# Patient Record
Sex: FEMALE | Race: WHITE | NOT HISPANIC OR LATINO
[De-identification: names, ages, dates, MRNs, and addresses within clinical notes are randomized per-mention and may not be internally consistent; named-entity substitution may affect disease eponyms.]

---

## 2017-02-08 PROBLEM — Z00.00 ENCOUNTER FOR PREVENTIVE HEALTH EXAMINATION: Status: ACTIVE | Noted: 2017-02-08

## 2017-02-09 ENCOUNTER — APPOINTMENT (OUTPATIENT)
Dept: DERMATOLOGY | Facility: CLINIC | Age: 67
End: 2017-02-09

## 2017-03-23 ENCOUNTER — APPOINTMENT (OUTPATIENT)
Dept: DERMATOLOGY | Facility: CLINIC | Age: 67
End: 2017-03-23

## 2019-04-30 ENCOUNTER — APPOINTMENT (OUTPATIENT)
Dept: DERMATOLOGY | Facility: CLINIC | Age: 69
End: 2019-04-30
Payer: MEDICARE

## 2019-04-30 PROCEDURE — 99214 OFFICE O/P EST MOD 30 MIN: CPT

## 2020-01-09 ENCOUNTER — APPOINTMENT (OUTPATIENT)
Dept: OBGYN | Facility: CLINIC | Age: 70
End: 2020-01-09
Payer: MEDICARE

## 2020-01-09 VITALS
DIASTOLIC BLOOD PRESSURE: 77 MMHG | HEART RATE: 78 BPM | WEIGHT: 130 LBS | SYSTOLIC BLOOD PRESSURE: 136 MMHG | BODY MASS INDEX: 24.55 KG/M2 | HEIGHT: 61 IN

## 2020-01-09 DIAGNOSIS — Z78.9 OTHER SPECIFIED HEALTH STATUS: ICD-10-CM

## 2020-01-09 DIAGNOSIS — Z91.81 HISTORY OF FALLING: ICD-10-CM

## 2020-01-09 DIAGNOSIS — H91.93 UNSPECIFIED HEARING LOSS, BILATERAL: ICD-10-CM

## 2020-01-09 DIAGNOSIS — N95.2 POSTMENOPAUSAL ATROPHIC VAGINITIS: ICD-10-CM

## 2020-01-09 DIAGNOSIS — Z86.39 PERSONAL HISTORY OF OTHER ENDOCRINE, NUTRITIONAL AND METABOLIC DISEASE: ICD-10-CM

## 2020-01-09 DIAGNOSIS — K21.9 GASTRO-ESOPHAGEAL REFLUX DISEASE W/OUT ESOPHAGITIS: ICD-10-CM

## 2020-01-09 DIAGNOSIS — Z87.39 PERSONAL HISTORY OF OTHER DISEASES OF THE MUSCULOSKELETAL SYSTEM AND CONNECTIVE TISSUE: ICD-10-CM

## 2020-01-09 LAB
BILIRUB UR QL STRIP: NORMAL
CLARITY UR: CLEAR
COLLECTION METHOD: NORMAL
GLUCOSE UR-MCNC: NORMAL
HCG UR QL: 0.2 EU/DL
HGB UR QL STRIP.AUTO: NORMAL
KETONES UR-MCNC: NORMAL
LEUKOCYTE ESTERASE UR QL STRIP: NORMAL
NITRITE UR QL STRIP: NORMAL
PH UR STRIP: 6.5
PROT UR STRIP-MCNC: NORMAL
SP GR UR STRIP: 1.02

## 2020-01-09 PROCEDURE — 81002 URINALYSIS NONAUTO W/O SCOPE: CPT

## 2020-01-09 PROCEDURE — 99397 PER PM REEVAL EST PAT 65+ YR: CPT

## 2020-01-09 RX ORDER — OMEPRAZOLE 20 MG/1
TABLET, ORALLY DISINTEGRATING, DELAYED RELEASE ORAL
Refills: 0 | Status: ACTIVE | COMMUNITY

## 2020-01-09 RX ORDER — SIMVASTATIN 10 MG/1
10 TABLET, FILM COATED ORAL
Refills: 0 | Status: ACTIVE | COMMUNITY

## 2020-01-09 NOTE — CHIEF COMPLAINT
[FreeTextEntry1] : 70 yo  P4 LMP at 52 yo presents for annual Gyn exam. She reports vaginal dryness, painful intercourse. Patient reports years "ago was prescribe something, but never used it"\par Patient reports loss twin sister one year ago from esophageal cancer. Her niece had a baby this past summer.

## 2020-01-09 NOTE — HISTORY OF PRESENT ILLNESS
[___ Month(s) Ago] : [unfilled] month(s) ago [Good] : being in good health [Last Mammogram ___] : Last Mammogram was [unfilled] [Last Bone Density ___] : Last bone density studies [unfilled] [Last Colonoscopy ___] : Last colonoscopy [unfilled] [Last Pap ___] : Last cervical pap smear was [unfilled] [Postmenopausal] : is postmenopausal [Definite:  ___ (Date)] : the last menstrual period was [unfilled] [Sexually Active] : is sexually active [Monogamous] : is monogamous [Male ___] : [unfilled] male [HSV] : HSV - [HIV] : HIV - [RPR] : RPR - [Hepatitis] : Hepatitis - [Chlamydia] : Chlamydia - [Gonorrhea] : Gonorrhea - [HPV] : HPV - [de-identified] : no hx of abnormal pap [de-identified] :  x 16 years ago

## 2020-01-09 NOTE — PHYSICAL EXAM
[Normal] : cervix [No Bleeding] : there was no active vaginal bleeding [Discharge] : a  ~M vaginal discharge was present [Erythema] : erythema [Uterine Adnexae] : were not tender and not enlarged [Vulvitis] : vulvitis [Scant] : scant [White] : white [FreeTextEntry7] : suboptimal pelvic exam secondary to body habitus

## 2020-01-10 LAB
CANDIDA VAG CYTO: NOT DETECTED
G VAGINALIS+PREV SP MTYP VAG QL MICRO: NOT DETECTED
HPV HIGH+LOW RISK DNA PNL CVX: DETECTED
T VAGINALIS VAG QL WET PREP: NOT DETECTED

## 2020-01-21 LAB — CYTOLOGY CVX/VAG DOC THIN PREP: ABNORMAL

## 2020-01-22 ENCOUNTER — RESULT REVIEW (OUTPATIENT)
Age: 70
End: 2020-01-22

## 2020-02-10 ENCOUNTER — APPOINTMENT (OUTPATIENT)
Dept: OBGYN | Facility: CLINIC | Age: 70
End: 2020-02-10
Payer: MEDICARE

## 2020-02-10 VITALS
WEIGHT: 128 LBS | SYSTOLIC BLOOD PRESSURE: 124 MMHG | HEIGHT: 61 IN | BODY MASS INDEX: 24.17 KG/M2 | DIASTOLIC BLOOD PRESSURE: 78 MMHG

## 2020-02-10 DIAGNOSIS — B97.7 PAPILLOMAVIRUS AS THE CAUSE OF DISEASES CLASSIFIED ELSEWHERE: ICD-10-CM

## 2020-02-10 PROCEDURE — 99203 OFFICE O/P NEW LOW 30 MIN: CPT

## 2021-02-09 ENCOUNTER — APPOINTMENT (OUTPATIENT)
Dept: DERMATOLOGY | Facility: CLINIC | Age: 71
End: 2021-02-09
Payer: MEDICARE

## 2021-02-09 PROCEDURE — 17110 DESTRUCTION B9 LES UP TO 14: CPT

## 2021-02-09 PROCEDURE — 99214 OFFICE O/P EST MOD 30 MIN: CPT | Mod: 25

## 2021-02-12 ENCOUNTER — APPOINTMENT (OUTPATIENT)
Dept: OBGYN | Facility: CLINIC | Age: 71
End: 2021-02-12
Payer: MEDICARE

## 2021-02-12 VITALS
DIASTOLIC BLOOD PRESSURE: 78 MMHG | BODY MASS INDEX: 25.68 KG/M2 | HEIGHT: 61 IN | SYSTOLIC BLOOD PRESSURE: 148 MMHG | WEIGHT: 136 LBS

## 2021-02-12 DIAGNOSIS — N90.89 OTHER SPECIFIED NONINFLAMMATORY DISORDERS OF VULVA AND PERINEUM: ICD-10-CM

## 2021-02-12 PROCEDURE — G0101: CPT

## 2021-02-12 PROCEDURE — 99214 OFFICE O/P EST MOD 30 MIN: CPT | Mod: 25

## 2021-02-12 NOTE — PHYSICAL EXAM
[Appropriately responsive] : appropriately responsive [Alert] : alert [No Acute Distress] : no acute distress [No Lymphadenopathy] : no lymphadenopathy [Regular Rate Rhythm] : regular rate rhythm [No Murmurs] : no murmurs [Clear to Auscultation B/L] : clear to auscultation bilaterally [Soft] : soft [Non-tender] : non-tender [Non-distended] : non-distended [No HSM] : No HSM [No Lesions] : no lesions [No Mass] : no mass [Oriented x3] : oriented x3 [Examination Of The Breasts] : a normal appearance [No Masses] : no breast masses were palpable [Labia Majora] : normal [Labia Minora] : normal [Normal] : normal [Uterine Adnexae] : normal [FreeTextEntry2] : bilateral minimal hypopigmented skin noted labia minora.

## 2021-02-12 NOTE — DISCUSSION/SUMMARY
[FreeTextEntry1] : Well woman exam\par \par pap done- if + hpv again will  need colposcopy\par mammo ordered\par bone density ordered\par recommended taking vit. D 2000 units daily and through diet obtain 1200 mg of calcium along with 2.5 hours of weight bearing exercise per week\par return in 1 year\par \par vulvar itching, possible LS-Clobetasol cream

## 2021-02-12 NOTE — HISTORY OF PRESENT ILLNESS
[FreeTextEntry1] : 69 yo here for av. She has slight outer vaginal itching on and off. She denies Pmb, no Nathalia.\par Last colonoscopy 2 years ago.\par Has h/o + hpv last year, negative pap.

## 2021-02-15 LAB — HPV HIGH+LOW RISK DNA PNL CVX: NOT DETECTED

## 2021-02-19 LAB — CYTOLOGY CVX/VAG DOC THIN PREP: ABNORMAL

## 2021-04-20 ENCOUNTER — APPOINTMENT (OUTPATIENT)
Dept: SURGERY | Facility: CLINIC | Age: 71
End: 2021-04-20
Payer: MEDICARE

## 2021-04-20 VITALS
SYSTOLIC BLOOD PRESSURE: 195 MMHG | BODY MASS INDEX: 25.49 KG/M2 | WEIGHT: 135 LBS | HEIGHT: 61 IN | OXYGEN SATURATION: 97 % | HEART RATE: 121 BPM | DIASTOLIC BLOOD PRESSURE: 99 MMHG | TEMPERATURE: 98 F

## 2021-04-20 DIAGNOSIS — F41.9 ANXIETY DISORDER, UNSPECIFIED: ICD-10-CM

## 2021-04-20 DIAGNOSIS — G47.9 SLEEP DISORDER, UNSPECIFIED: ICD-10-CM

## 2021-04-20 PROCEDURE — 99204 OFFICE O/P NEW MOD 45 MIN: CPT

## 2021-04-20 NOTE — HISTORY OF PRESENT ILLNESS
[FreeTextEntry1] : I had the pleasure of seeing Kristie Gruber in the office for newly diagnosed left breast cancer. Kristie is a celestine 69 yo postmenopausal female who is accompanied by her friend.\par \par She recently underwent bilateral screening mammogram 3/8/2021 which demonstrated a new mammographic mass at the 9:00 position of the left breast which was biopsied and demonstrated poorly differentiated invasive carcinoma ER- AR- [id number] -\par \par She denies dominant breast mass, skin changes or nipple discharge. \par She has a family history including twin sister dx and  from esophageal carcinoma. \par \par Imaging: \par [facility]\par 3/18/2021\par Bilateral screening mammogram/tomosynthesis BIRADS 0: Needs additional imaging\par Impression: there is a 1cm circumscribed nodule in the left breast medially. \par 2021 Left breast ultrasound: Impression: 4c Left breast mass corresponding to mammographic finding. \par 2021 Left breast post biopsy mammogram\par Impression: Status post left breast ultrasound guided core biopsy. Pathology report in EMR- left breast triple negative grade 3 IDC.\par \par We reviewed the pathophysiology of poorly differentiated invasive ductal carcinoma and the patient understands that it is poorly differentiated triple negative and aggressive.  I have discussed the significance of triple negative breast cancer.  We also discussed the need for staging work up which will include a PET/CT scan and MRI breast to rule out distant metastatic disease if the medical oncologist recommends.\par \par She also understands that genetic testing is recommended secondary to her age.  She was counseled and tested during the visit.  \par \par We discussed treatment options of breast conservation and mastectomy.  She understands that secondary to the evidence of locally advanced disease, systemic treatment is recommended.  Prior to initiation of systemic therapy, staging work up will be performed. We will obtain MRI of the breast\par \par In addition we reviewed risks v benefits of surgical options and treatment modalities including chemotherapy, targeted therapy and radiation therapy.\par \par All questions were answered.\par

## 2021-04-20 NOTE — ASSESSMENT
[FreeTextEntry1] : 69 yo postmenopausal female presents with newly diagnosed triple negative breast cancer of the left breast measuring approximately 1cm We discussed need for treatment including but not limited to chemotherapy, surgery +/- radiation. She  will undergo further work up and consults with return to discuss surgical option.\par 1. MRI of the breast\par 2. Internal review of imaging/pathology\par 3. Radiation Oncology consult\par 4. Plastic Surgery Consult\par 5. Genetic testing

## 2021-04-20 NOTE — PHYSICAL EXAM
[Normocephalic] : normocephalic [Atraumatic] : atraumatic [EOMI] : extra ocular movement intact [PERRL] : pupils equal, round and reactive to light [Sclera nonicteric] : sclera nonicteric [Supple] : supple [No Supraclavicular Adenopathy] : no supraclavicular adenopathy [Examined in the supine and seated position] : examined in the supine and seated position [No dominant masses] : no dominant masses in right breast  [No dominant masses] : no dominant masses left breast [No Nipple Retraction] : no left nipple retraction [No Nipple Discharge] : no left nipple discharge [Breast Nipple Inversion] : nipples not inverted [Breast Nipple Retraction] : nipples not retracted [Breast Nipple Flattening] : nipples not flattened [Breast Nipple Fissures] : nipples not fissured [Breast Abnormal Lactation (Galactorrhea)] : no galactorrhea [Breast Abnormal Secretion Bloody Fluid] : no bloody discharge [Breast Abnormal Secretion Serous Fluid] : no serous discharge [Breast Abnormal Secretion Opalescent Fluid] : no milky discharge [No Axillary Lymphadenopathy] : no left axillary lymphadenopathy [No Edema] : no edema [No Rashes] : no rashes [No Ulceration] : no ulceration [de-identified] : No dominant breast mass, everted nipple without discharge.  [de-identified] : No dominant breast mass, everted nipple without discharge. Bruising at biopsy site.

## 2021-04-27 ENCOUNTER — OUTPATIENT (OUTPATIENT)
Dept: OUTPATIENT SERVICES | Facility: HOSPITAL | Age: 71
LOS: 1 days | End: 2021-04-27
Payer: MEDICARE

## 2021-04-27 ENCOUNTER — APPOINTMENT (OUTPATIENT)
Dept: RADIATION ONCOLOGY | Facility: CLINIC | Age: 71
End: 2021-04-27
Payer: MEDICARE

## 2021-04-27 ENCOUNTER — APPOINTMENT (OUTPATIENT)
Dept: MRI IMAGING | Facility: CLINIC | Age: 71
End: 2021-04-27
Payer: MEDICARE

## 2021-04-27 VITALS
HEART RATE: 93 BPM | BODY MASS INDEX: 25.36 KG/M2 | RESPIRATION RATE: 16 BRPM | WEIGHT: 134.2 LBS | SYSTOLIC BLOOD PRESSURE: 161 MMHG | OXYGEN SATURATION: 98 % | DIASTOLIC BLOOD PRESSURE: 78 MMHG

## 2021-04-27 DIAGNOSIS — C50.919 MALIGNANT NEOPLASM OF UNSPECIFIED SITE OF UNSPECIFIED FEMALE BREAST: ICD-10-CM

## 2021-04-27 DIAGNOSIS — Z80.0 FAMILY HISTORY OF MALIGNANT NEOPLASM OF DIGESTIVE ORGANS: ICD-10-CM

## 2021-04-27 DIAGNOSIS — Z87.891 PERSONAL HISTORY OF NICOTINE DEPENDENCE: ICD-10-CM

## 2021-04-27 PROCEDURE — 77049 MRI BREAST C-+ W/CAD BI: CPT | Mod: 26,MG

## 2021-04-27 PROCEDURE — C8937: CPT

## 2021-04-27 PROCEDURE — G1004: CPT

## 2021-04-27 PROCEDURE — A9585: CPT

## 2021-04-27 PROCEDURE — 99205 OFFICE O/P NEW HI 60 MIN: CPT | Mod: 25

## 2021-04-27 PROCEDURE — C8908: CPT

## 2021-04-28 PROBLEM — Z80.0 FAMILY HISTORY OF MALIGNANT NEOPLASM OF ESOPHAGUS: Status: ACTIVE | Noted: 2020-01-09

## 2021-04-28 PROBLEM — Z87.891 FORMER SMOKER: Status: ACTIVE | Noted: 2020-01-09

## 2021-04-28 NOTE — OB/GYN HISTORY
[Approximately ___ (Month)] : the LMP was approximately [unfilled] month(s) ago [___] : Total Pregnancies: [unfilled] [History of Hormone Replacement Therapy] : no history of hormone replacement therapy

## 2021-04-28 NOTE — LETTER GREETING
[Dear Doctor] : Dear Doctor, [Consult Letter:] : Your patient, [unfilled] was seen in my office today for consultation. [Please see my note below.] : Please see my note below. [FreeTextEntry2] : Lora Dorsey MD

## 2021-04-28 NOTE — PHYSICAL EXAM
[Normal] : no focal deficits [Oriented To Time, Place, And Person] : oriented to person, place, and time [de-identified] : bra cup size 36 D; ecchymosis over lateral aspect of left breast at biopsy site. [de-identified] : appears anxious

## 2021-04-28 NOTE — DISEASE MANAGEMENT
[Clinical] : TNM Stage: c [II] : II [FreeTextEntry4] : invasive ductal carcinoma, triple negative [TTNM] : 1b [MTNM] : 0 [NTNM] : 1

## 2021-04-28 NOTE — HISTORY OF PRESENT ILLNESS
[FreeTextEntry1] :  This  70 year year-old female  presents for radiation medicine consultation.  Ms. Gruber recently underwent bilateral screening mammogram 3/8/2021 which demonstrated a new mammographic mass at the 9:00 position of the LEFT breast.  Ultrasound core guided biopsy reveled poorly differentiated invasive carcinoma ER- NH- [id number] -.  Patient also found to have a left axialary LN on US which may represent a reaction to the recent COVID vaccine.\par \par

## 2021-04-28 NOTE — LETTER CLOSING
[Consult Closing:] : Thank you for allowing me to participate in the care of this patient.  If you have any questions, please do not hesitate to contact me. [Sincerely yours,] : Sincerely yours, [FreeTextEntry3] : Easton Wright MD\par Physician in Chief\par Department of Radiation Medicine\par [facility] [place]\par [facility] [place]\par \par  of Radiation Medicine\par [facility]\par at  [facility]\par \par Radiation \par [facility] [[email or web address]]/\par [facility] [place]\par [address]\par [place], [place] [address]\par \par Tel: [phone]\par Fax: [phone]\par

## 2021-04-28 NOTE — REVIEW OF SYSTEMS
[Patient Intake Form Reviewed] : Patient intake form was reviewed [Anxiety] : anxiety [Negative] : Allergic/Immunologic [FreeTextEntry7] : reflux disease [FreeTextEntry5] : hyperlipidemia [FreeTextEntry9] : osteopenia

## 2021-05-03 ENCOUNTER — OUTPATIENT (OUTPATIENT)
Dept: OUTPATIENT SERVICES | Facility: HOSPITAL | Age: 71
LOS: 1 days | Discharge: HOME | End: 2021-05-03

## 2021-05-03 DIAGNOSIS — C50.919 MALIGNANT NEOPLASM OF UNSPECIFIED SITE OF UNSPECIFIED FEMALE BREAST: ICD-10-CM

## 2021-05-03 DIAGNOSIS — R93.89 ABNORMAL FINDINGS ON DIAGNOSTIC IMAGING OF OTHER SPECIFIED BODY STRUCTURES: ICD-10-CM

## 2021-05-04 ENCOUNTER — RESULT REVIEW (OUTPATIENT)
Age: 71
End: 2021-05-04

## 2021-05-05 ENCOUNTER — RESULT REVIEW (OUTPATIENT)
Age: 71
End: 2021-05-05

## 2021-05-05 LAB — SURGICAL PATHOLOGY STUDY: SIGNIFICANT CHANGE UP

## 2021-05-11 ENCOUNTER — OUTPATIENT (OUTPATIENT)
Dept: OUTPATIENT SERVICES | Facility: HOSPITAL | Age: 71
LOS: 1 days | End: 2021-05-11
Payer: MEDICARE

## 2021-05-11 ENCOUNTER — RESULT REVIEW (OUTPATIENT)
Age: 71
End: 2021-05-11

## 2021-05-11 ENCOUNTER — APPOINTMENT (OUTPATIENT)
Dept: ULTRASOUND IMAGING | Facility: CLINIC | Age: 71
End: 2021-05-11
Payer: MEDICARE

## 2021-05-11 ENCOUNTER — APPOINTMENT (OUTPATIENT)
Dept: SURGERY | Facility: CLINIC | Age: 71
End: 2021-05-11
Payer: MEDICARE

## 2021-05-11 ENCOUNTER — APPOINTMENT (OUTPATIENT)
Dept: PLASTIC SURGERY | Facility: CLINIC | Age: 71
End: 2021-05-11
Payer: MEDICARE

## 2021-05-11 VITALS
WEIGHT: 133 LBS | SYSTOLIC BLOOD PRESSURE: 153 MMHG | OXYGEN SATURATION: 98 % | HEART RATE: 84 BPM | TEMPERATURE: 97.3 F | BODY MASS INDEX: 25.11 KG/M2 | DIASTOLIC BLOOD PRESSURE: 78 MMHG | HEIGHT: 61 IN

## 2021-05-11 DIAGNOSIS — N64.89 OTHER SPECIFIED DISORDERS OF BREAST: ICD-10-CM

## 2021-05-11 DIAGNOSIS — Z12.31 ENCOUNTER FOR SCREENING MAMMOGRAM FOR MALIGNANT NEOPLASM OF BREAST: ICD-10-CM

## 2021-05-11 PROCEDURE — 99214 OFFICE O/P EST MOD 30 MIN: CPT

## 2021-05-11 PROCEDURE — 76882 US LMTD JT/FCL EVL NVASC XTR: CPT | Mod: 26,LT

## 2021-05-11 PROCEDURE — 76882 US LMTD JT/FCL EVL NVASC XTR: CPT

## 2021-05-11 PROCEDURE — 99204 OFFICE O/P NEW MOD 45 MIN: CPT

## 2021-05-11 RX ORDER — CYCLOSPORINE 0.5 MG/ML
0.05 EMULSION OPHTHALMIC
Qty: 180 | Refills: 0 | Status: ACTIVE | COMMUNITY
Start: 2021-02-02

## 2021-05-14 ENCOUNTER — TRANSCRIPTION ENCOUNTER (OUTPATIENT)
Age: 71
End: 2021-05-14

## 2021-05-18 ENCOUNTER — APPOINTMENT (OUTPATIENT)
Dept: SURGERY | Facility: CLINIC | Age: 71
End: 2021-05-18
Payer: MEDICARE

## 2021-05-18 VITALS
SYSTOLIC BLOOD PRESSURE: 147 MMHG | DIASTOLIC BLOOD PRESSURE: 85 MMHG | BODY MASS INDEX: 25.12 KG/M2 | TEMPERATURE: 97.7 F | WEIGHT: 133.06 LBS | HEIGHT: 61 IN | HEART RATE: 87 BPM | OXYGEN SATURATION: 94 %

## 2021-05-18 DIAGNOSIS — N63.0 UNSPECIFIED LUMP IN UNSPECIFIED BREAST: ICD-10-CM

## 2021-05-18 PROCEDURE — 99214 OFFICE O/P EST MOD 30 MIN: CPT

## 2021-05-19 PROBLEM — N63.0 BREAST NODULE: Status: ACTIVE | Noted: 2021-03-25

## 2021-05-19 NOTE — HISTORY OF PRESENT ILLNESS
[FreeTextEntry1] : I had the pleasure of seeing Kristie Gruber in the office for newly diagnosed left breast cancer. Kristie is a celestine 71 yo postmenopausal female who is accompanied by her friend.\par \par She recently underwent bilateral screening mammogram 3/8/2021 which demonstrated a new mammographic mass at the 9:00 position of the left breast which was biopsied and demonstrated poorly differentiated invasive carcinoma ER- NM- [id number] -\par \par She denies dominant breast mass, skin changes or nipple discharge. \par She has a family history including twin sister dx and  from esophageal carcinoma. \par \par Imaging: \par [facility]\par 3/18/2021\par Bilateral screening mammogram/tomosynthesis BIRADS 0: Needs additional imaging\par Impression: there is a 1cm circumscribed nodule in the left breast medially. \par 2021 Left breast ultrasound: Impression: 4c Left breast mass corresponding to mammographic finding. \par 2021 Left breast post biopsy mammogram\par Impression: Status post left breast ultrasound guided core biopsy. Pathology report in EMR- left breast triple negative grade 3 IDC.\par \par 2021  MR breast\par 1.0 cm malignancy in the central left breast.\par No MRI evidence of multicentric or contralateral disease. Study limited by moderate background enhancement.\par Nonspecific prominent left axillary lymph nodes which may be reactive in the setting of recent Covid vaccination. Left axillary ultrasound evaluation and sampling of the most suspicious lymph node can be performed if requested.\par RECOMMENDATION: Surgical or oncologic management.\par BI-RADS 6- Known Biopsy-Proven Malignancy\par \par 2021  US axilla only left\par Impression:  No sonographically suspicious axillary lymph nodes.  BIRADS 2 benign.\par \par We reviewed left axilla US and she understands the lymph nodes are not suspicious.  She met with plastic and plastics will not be involved.  Plan for left breast savita  lumpectomy with SLNB possible ALND.  She will be scheduled for consultation with medical oncology for chemotherapy and radiation oncologist in [place] per patients request since it is close to home.\par \par All questions were answered.\par

## 2021-05-19 NOTE — ASSESSMENT
[FreeTextEntry1] : 71 yo postmenopausal female presents with newly diagnosed triple negative breast cancer of the left breast measuring approximately 1cm.  Plan for left breast savita  lumpectomy with SLNB possible ALND.\par 1. Left breast saiv  lumpectomy with SLNB possible ALND\par 2. Medical clearance\par 3.  Consult with radiation oncology\par 4.  Consult with medical oncology\par 5.  Internal review of imaging

## 2021-05-19 NOTE — PHYSICAL EXAM
[Normocephalic] : normocephalic [Atraumatic] : atraumatic [EOMI] : extra ocular movement intact [PERRL] : pupils equal, round and reactive to light [Sclera nonicteric] : sclera nonicteric [Supple] : supple [No Supraclavicular Adenopathy] : no supraclavicular adenopathy [Examined in the supine and seated position] : examined in the supine and seated position [No dominant masses] : no dominant masses in right breast  [No dominant masses] : no dominant masses left breast [No Nipple Retraction] : no left nipple retraction [No Nipple Discharge] : no left nipple discharge [Breast Nipple Inversion] : nipples not inverted [Breast Nipple Retraction] : nipples not retracted [Breast Nipple Flattening] : nipples not flattened [Breast Nipple Fissures] : nipples not fissured [Breast Abnormal Lactation (Galactorrhea)] : no galactorrhea [Breast Abnormal Secretion Bloody Fluid] : no bloody discharge [Breast Abnormal Secretion Serous Fluid] : no serous discharge [Breast Abnormal Secretion Opalescent Fluid] : no milky discharge [No Axillary Lymphadenopathy] : no left axillary lymphadenopathy [No Edema] : no edema [No Rashes] : no rashes [No Ulceration] : no ulceration [de-identified] : No dominant breast mass, everted nipple without discharge.  [de-identified] : No dominant breast mass, everted nipple without discharge. Bruising at biopsy site.

## 2021-06-03 NOTE — HISTORY OF PRESENT ILLNESS
[FreeTextEntry1] : Ms. FANNY COATES  is a 70 year  year old female  with past medical history of anxiety, deafness, who presents with newly diagnosed left  breast cancer.  Pt was diagnosed on a screening mammogram 3/8/21, and later confirmed triple negative invasive carcinoma with ultrasound guided core biopsy 4/12/21 pt was referred to the office by Dr Dorsey  to discuss her reconstructive options. \par \par smoking status: non smoker\par anticoagulation: none\par history of bleeding disorder: negative\par family history of breast cancer: negative\par

## 2021-06-08 ENCOUNTER — OUTPATIENT (OUTPATIENT)
Dept: OUTPATIENT SERVICES | Facility: HOSPITAL | Age: 71
LOS: 1 days | End: 2021-06-08
Payer: MEDICARE

## 2021-06-08 VITALS
WEIGHT: 134.92 LBS | SYSTOLIC BLOOD PRESSURE: 154 MMHG | DIASTOLIC BLOOD PRESSURE: 70 MMHG | RESPIRATION RATE: 20 BRPM | HEIGHT: 62 IN | TEMPERATURE: 98 F | HEART RATE: 87 BPM

## 2021-06-08 DIAGNOSIS — C50.919 MALIGNANT NEOPLASM OF UNSPECIFIED SITE OF UNSPECIFIED FEMALE BREAST: ICD-10-CM

## 2021-06-08 DIAGNOSIS — Z01.818 ENCOUNTER FOR OTHER PREPROCEDURAL EXAMINATION: ICD-10-CM

## 2021-06-08 DIAGNOSIS — Z98.890 OTHER SPECIFIED POSTPROCEDURAL STATES: Chronic | ICD-10-CM

## 2021-06-08 DIAGNOSIS — E78.5 HYPERLIPIDEMIA, UNSPECIFIED: ICD-10-CM

## 2021-06-08 DIAGNOSIS — Z29.8 ENCOUNTER FOR OTHER SPECIFIED PROPHYLACTIC MEASURES: ICD-10-CM

## 2021-06-08 LAB
ANION GAP SERPL CALC-SCNC: 11 MMOL/L — SIGNIFICANT CHANGE UP (ref 5–17)
APTT BLD: 32.8 SEC — SIGNIFICANT CHANGE UP (ref 27.5–35.5)
BASOPHILS # BLD AUTO: 0.11 K/UL — SIGNIFICANT CHANGE UP (ref 0–0.2)
BASOPHILS NFR BLD AUTO: 1.3 % — SIGNIFICANT CHANGE UP (ref 0–2)
BLD GP AB SCN SERPL QL: SIGNIFICANT CHANGE UP
BUN SERPL-MCNC: 18 MG/DL — SIGNIFICANT CHANGE UP (ref 8–20)
CALCIUM SERPL-MCNC: 9.7 MG/DL — SIGNIFICANT CHANGE UP (ref 8.6–10.2)
CHLORIDE SERPL-SCNC: 102 MMOL/L — SIGNIFICANT CHANGE UP (ref 98–107)
CO2 SERPL-SCNC: 27 MMOL/L — SIGNIFICANT CHANGE UP (ref 22–29)
CREAT SERPL-MCNC: 0.89 MG/DL — SIGNIFICANT CHANGE UP (ref 0.5–1.3)
EOSINOPHIL # BLD AUTO: 0.23 K/UL — SIGNIFICANT CHANGE UP (ref 0–0.5)
EOSINOPHIL NFR BLD AUTO: 2.8 % — SIGNIFICANT CHANGE UP (ref 0–6)
GLUCOSE SERPL-MCNC: 96 MG/DL — SIGNIFICANT CHANGE UP (ref 70–99)
HCT VFR BLD CALC: 40.3 % — SIGNIFICANT CHANGE UP (ref 34.5–45)
HGB BLD-MCNC: 13 G/DL — SIGNIFICANT CHANGE UP (ref 11.5–15.5)
IMM GRANULOCYTES NFR BLD AUTO: 0.1 % — SIGNIFICANT CHANGE UP (ref 0–1.5)
INR BLD: 1.02 RATIO — SIGNIFICANT CHANGE UP (ref 0.88–1.16)
LYMPHOCYTES # BLD AUTO: 2.18 K/UL — SIGNIFICANT CHANGE UP (ref 1–3.3)
LYMPHOCYTES # BLD AUTO: 26.6 % — SIGNIFICANT CHANGE UP (ref 13–44)
MCHC RBC-ENTMCNC: 31.6 PG — SIGNIFICANT CHANGE UP (ref 27–34)
MCHC RBC-ENTMCNC: 32.3 GM/DL — SIGNIFICANT CHANGE UP (ref 32–36)
MCV RBC AUTO: 97.8 FL — SIGNIFICANT CHANGE UP (ref 80–100)
MONOCYTES # BLD AUTO: 0.6 K/UL — SIGNIFICANT CHANGE UP (ref 0–0.9)
MONOCYTES NFR BLD AUTO: 7.3 % — SIGNIFICANT CHANGE UP (ref 2–14)
NEUTROPHILS # BLD AUTO: 5.08 K/UL — SIGNIFICANT CHANGE UP (ref 1.8–7.4)
NEUTROPHILS NFR BLD AUTO: 61.9 % — SIGNIFICANT CHANGE UP (ref 43–77)
PLATELET # BLD AUTO: 315 K/UL — SIGNIFICANT CHANGE UP (ref 150–400)
POTASSIUM SERPL-MCNC: 4.4 MMOL/L — SIGNIFICANT CHANGE UP (ref 3.5–5.3)
POTASSIUM SERPL-SCNC: 4.4 MMOL/L — SIGNIFICANT CHANGE UP (ref 3.5–5.3)
PROTHROM AB SERPL-ACNC: 11.8 SEC — SIGNIFICANT CHANGE UP (ref 10.6–13.6)
RBC # BLD: 4.12 M/UL — SIGNIFICANT CHANGE UP (ref 3.8–5.2)
RBC # FLD: 12.2 % — SIGNIFICANT CHANGE UP (ref 10.3–14.5)
SODIUM SERPL-SCNC: 140 MMOL/L — SIGNIFICANT CHANGE UP (ref 135–145)
WBC # BLD: 8.21 K/UL — SIGNIFICANT CHANGE UP (ref 3.8–10.5)
WBC # FLD AUTO: 8.21 K/UL — SIGNIFICANT CHANGE UP (ref 3.8–10.5)

## 2021-06-08 PROCEDURE — G0463: CPT

## 2021-06-08 PROCEDURE — 93010 ELECTROCARDIOGRAM REPORT: CPT

## 2021-06-08 PROCEDURE — 93005 ELECTROCARDIOGRAM TRACING: CPT

## 2021-06-08 RX ORDER — ALPRAZOLAM 0.25 MG
0 TABLET ORAL
Qty: 0 | Refills: 0 | DISCHARGE

## 2021-06-08 RX ORDER — SIMVASTATIN 20 MG/1
0 TABLET, FILM COATED ORAL
Qty: 0 | Refills: 0 | DISCHARGE

## 2021-06-08 RX ORDER — CEFAZOLIN SODIUM 1 G
2000 VIAL (EA) INJECTION ONCE
Refills: 0 | Status: COMPLETED | OUTPATIENT
Start: 2021-06-21 | End: 2021-06-21

## 2021-06-08 NOTE — H&P PST ADULT - ATTENDING COMMENTS
Patient with left breast triple negative breast cancer. She is now undergoing breast conservation therapy with left breast savita  wide lumpectomy with sentinel lymph node biopsy possible axillary dissection. She understands risks v benefits and technical aspects of procedure. All questions were answered.

## 2021-06-08 NOTE — H&P PST ADULT - ASSESSMENT
70 year old female with newly diagnosed with  Invasive poorly differentiated ductal carcinoma of the left breast. She is schedule for left breast savita  localized wide lumpectomy with sentinel node biopsy, possible axillary dissection. With Dr. Dorsey on 6/21  Patient educated on written and verbal preop instructions.   medications reviewed, instructions given on what medications to take and what not to take.  Pt instructed to stop Herbals or anti-inflammatory meds one week prior to surgery and discuss with PMD.     CAPRINI SCORE [CLOT]    AGE RELATED RISK FACTORS                                                       MOBILITY RELATED FACTORS  [ ] Age 41-60 years                                            (1 Point)                  [ ] Bed rest                                                        (1 Point)  [x ] Age: 61-74 years                                           (2 Points)                 [ ] Plaster cast                                                   (2 Points)  [ ] Age= 75 years                                              (3 Points)                   [ ] Bed bound for more than 72 hours                 (2 Points)    DISEASE RELATED RISK FACTORS                                               GENDER SPECIFIC FACTORS  [ ] Edema in the lower extremities                       (1 Point)              [ ] Pregnancy                                                     (1 Point)  [x ] Varicose veins                                               (1 Point)                     [ ] Post-partum < 6 weeks                                   (1 Point)             [ x] BMI > 25 Kg/m2                                            (1 Point)                     [ ] Hormonal therapy  or oral contraception          (1 Point)                 [ ] Sepsis (in the previous month)                        (1 Point)                [ ] History of pregnancy complications                 (1 point)  [ ] Pneumonia or serious lung disease                                                [ ] Unexplained or recurrent                     (1 Point)           (in the previous month)                               (1 Point)  [ ] Abnormal pulmonary function test                     (1 Point)                 SURGERY RELATED RISK FACTORS  [ ] Acute myocardial infarction                              (1 Point)                   [ ]  Section                                             (1 Point)  [ ] Congestive heart failure (in the previous month)  (1 Point)           [ ] Minor surgery                                                  (1 Point)   [ ] Inflammatory bowel disease                             (1 Point)                   [ ] Arthroscopic surgery                                        (2 Points)  [ ] Central venous access                                      (2 Points)                    [x ] General surgery lasting more than 45 minutes   (2 Points)       [ ] Stroke (in the previous month)                          (5 Points)                 [ ] Elective arthroplasty                                         (5 Points)            [ x] Malignacy (past or present)                          (2 ponits)                                                                                                                            HEMATOLOGY RELATED FACTORS                                                 TRAUMA RELATED RISK FACTORS  [ ] Prior episodes of VTE                                     (3 Points)                [ ] Fracture of the hip, pelvis, or leg                       (5 Points)  [ ] Positive family history for VTE                         (3 Points)             [ ] Acute spinal cord injury (in the previous month)  (5 Points)  [ ] Prothrombin 93287 A                                     (3 Points)                [ ] Paralysis  (less than 1 month)                             (5 Points)  [ ] Factor V Leiden                                             (3 Points)                   [ ] Multiple Trauma within 1 month                        (5 Points)  [ ] Lupus anticoagulants                                     (3 Points)                                                           [ ] Anticardiolipin antibodies                               (3 Points)                                                       [ ] High homocysteine in the blood                      (3 Points)                                             [ ] Other congenital or acquired thrombophilia      (3 Points)                                                [ ] Heparin induced thrombocytopenia                  (3 Points)                                          Total Score [    8      ]    Caprini Score 0 - 2:  Low Risk, No VTE Prophylaxis required for most patients, encourage ambulation  Caprini Score 3 - 6:  At Risk, pharmacologic VTE prophylaxis is indicated for most patients (in the absence of a contraindication)  Caprini Score Greater than or = 7:  High Risk, pharmacologic VTE prophylaxis is indicated for most patients (in the absence of a contraindication)    70 year old female newly diagnosed with  Invasive poorly differentiated ductal carcinoma of the left breast. She is schedule for left breast savita  localized wide lumpectomy with sentinel node biopsy, possible axillary dissection. With Dr. Dorsey on   Patient educated on written and verbal preop instructions.   medications reviewed, instructions given on what medications to take and what not to take.  Pt instructed to stop Herbals or anti-inflammatory meds one week prior to surgery and discuss with PMD.

## 2021-06-08 NOTE — ASU PATIENT PROFILE, ADULT - LEARNING ASSESSMENT (PATIENT) ADDITIONAL COMMENTS
NP, instructed pt on pre-op instructions/teaching, tips for safer surgery, pain management scale, pre-surgical infection prevention instructions, Pt verbalized understanding of all instructions given.

## 2021-06-08 NOTE — H&P PST ADULT - NSICDXPASTSURGICALHX_GEN_ALL_CORE_FT
PAST SURGICAL HISTORY:  H/O colonoscopy     S/P ear surgery      PAST SURGICAL HISTORY:  H/O colonoscopy     S/P ear surgery     S/P trigger finger release

## 2021-06-08 NOTE — H&P PST ADULT - NSICDXPROBLEM_GEN_ALL_CORE_FT
PROBLEM DIAGNOSES  Problem: Breast CA  Assessment and Plan: left breast  localized wide lumpectomy with sentinel node biopsy, possible axillary dissection with Dr. Dorsey    Problem: Hyperlipemia  Assessment and Plan: medical clearance with Dr. Atkinson [phone]    Problem: Need for other prophylactic measure  Assessment and Plan: High risk,  Surgical team should assess /Strongly recommend pharmacological and mechanical measures for VTE prophylaxis

## 2021-06-08 NOTE — H&P PST ADULT - NSICDXFAMILYHX_GEN_ALL_CORE_FT
FAMILY HISTORY:  Father  Still living? Unknown  FH: heart attack, Age at diagnosis: Age Unknown    Mother  Still living? Unknown  FH: gastric cancer, Age at diagnosis: Age Unknown    Sibling  Still living? Unknown  FH: esophageal cancer, Age at diagnosis: Age Unknown

## 2021-06-08 NOTE — H&P PST ADULT - NSICDXPASTMEDICALHX_GEN_ALL_CORE_FT
PAST MEDICAL HISTORY:  Breast CA     Dry eye syndrome of both eyes     GERD (gastroesophageal reflux disease)     [place] (hard of hearing)

## 2021-06-08 NOTE — ASU PATIENT PROFILE, ADULT - PMH
Breast CA    Dry eye syndrome of both eyes    GERD (gastroesophageal reflux disease)    [place] (hard of hearing)

## 2021-06-08 NOTE — H&P PST ADULT - HISTORY OF PRESENT ILLNESS
mammgo  70 year old female present today for PST. She reports on her routine mammogram it was noted to have a breast  lesion noted central to the left breast. She underwent a fine needle biopsy of the left breast and path revealed triple  negative-  Invasive poorly differentiated ductal carcinoma.  She is now schedule for left breast savita  localized wide lumpectomy with sentinel node biopsy, possible axillary dissection. With Dr. Hayward on 6/21/21 April 27 2021, MRI breast IMPRESSION:  1.0 cm malignancy in the central left breast.  No MRI evidence of multicentric or contralateral disease.  Study limited by moderate background enhancement.  Nonspecific prominent left axillary lymph nodes which may be reactive in the setting of recent Covid vaccination.  Left axillary ultrasound evaluation and sampling of the most suspicious lymph node can be performed if requested.  5/11/21 IMPRESSION:   No sonographically suspicious axillary lymph nodes.  Surgical management of the known left breast cancer remains recommended.    4/12/21  Path Final Diagnosis  Breast, left, 9:00, N5, core biopsy:  - Invasive poorly differentiated ductal carcinoma with apocrine  features  - Farhad score 8/9 (3 + 3 + 2) in this limited material  - Invasive tumor measures at least 0.7 cm  - As per outside pathology report: [facility] Path [id number]  ER: Negative, 0%  MS: Negative, 0%  Her-2: Negative, 0

## 2021-06-10 ENCOUNTER — OUTPATIENT (OUTPATIENT)
Dept: OUTPATIENT SERVICES | Facility: HOSPITAL | Age: 71
LOS: 1 days | End: 2021-06-10
Payer: MEDICARE

## 2021-06-10 ENCOUNTER — RESULT REVIEW (OUTPATIENT)
Age: 71
End: 2021-06-10

## 2021-06-10 DIAGNOSIS — C50.919 MALIGNANT NEOPLASM OF UNSPECIFIED SITE OF UNSPECIFIED FEMALE BREAST: ICD-10-CM

## 2021-06-10 DIAGNOSIS — Z98.890 OTHER SPECIFIED POSTPROCEDURAL STATES: Chronic | ICD-10-CM

## 2021-06-10 PROBLEM — K21.9 GASTRO-ESOPHAGEAL REFLUX DISEASE WITHOUT ESOPHAGITIS: Chronic | Status: ACTIVE | Noted: 2021-06-08

## 2021-06-10 PROBLEM — H91.90 UNSPECIFIED HEARING LOSS, UNSPECIFIED EAR: Chronic | Status: ACTIVE | Noted: 2021-06-08

## 2021-06-10 PROBLEM — H04.123 DRY EYE SYNDROME OF BILATERAL LACRIMAL GLANDS: Chronic | Status: ACTIVE | Noted: 2021-06-08

## 2021-06-10 LAB — SURGICAL PATHOLOGY STUDY: SIGNIFICANT CHANGE UP

## 2021-06-10 PROCEDURE — 88321 CONSLTJ&REPRT SLD PREP ELSWR: CPT

## 2021-06-16 ENCOUNTER — OUTPATIENT (OUTPATIENT)
Dept: OUTPATIENT SERVICES | Facility: HOSPITAL | Age: 71
LOS: 1 days | End: 2021-06-16
Payer: MEDICARE

## 2021-06-16 ENCOUNTER — APPOINTMENT (OUTPATIENT)
Dept: ULTRASOUND IMAGING | Facility: CLINIC | Age: 71
End: 2021-06-16
Payer: MEDICARE

## 2021-06-16 DIAGNOSIS — Z98.890 OTHER SPECIFIED POSTPROCEDURAL STATES: Chronic | ICD-10-CM

## 2021-06-16 DIAGNOSIS — C50.919 MALIGNANT NEOPLASM OF UNSPECIFIED SITE OF UNSPECIFIED FEMALE BREAST: ICD-10-CM

## 2021-06-16 PROCEDURE — C1739: CPT

## 2021-06-16 PROCEDURE — 19285 PERQ DEV BREAST 1ST US IMAG: CPT | Mod: LT

## 2021-06-16 PROCEDURE — 19285 PERQ DEV BREAST 1ST US IMAG: CPT

## 2021-06-18 ENCOUNTER — APPOINTMENT (OUTPATIENT)
Dept: DISASTER EMERGENCY | Facility: CLINIC | Age: 71
End: 2021-06-18

## 2021-06-20 ENCOUNTER — TRANSCRIPTION ENCOUNTER (OUTPATIENT)
Age: 71
End: 2021-06-20

## 2021-06-21 ENCOUNTER — RESULT REVIEW (OUTPATIENT)
Age: 71
End: 2021-06-21

## 2021-06-21 ENCOUNTER — OUTPATIENT (OUTPATIENT)
Dept: OUTPATIENT SERVICES | Facility: HOSPITAL | Age: 71
LOS: 1 days | End: 2021-06-21
Payer: MEDICARE

## 2021-06-21 ENCOUNTER — APPOINTMENT (OUTPATIENT)
Dept: SURGERY | Facility: HOSPITAL | Age: 71
End: 2021-06-21
Payer: MEDICARE

## 2021-06-21 VITALS
WEIGHT: 134.92 LBS | DIASTOLIC BLOOD PRESSURE: 75 MMHG | TEMPERATURE: 98 F | SYSTOLIC BLOOD PRESSURE: 139 MMHG | HEIGHT: 61 IN | RESPIRATION RATE: 17 BRPM | HEART RATE: 75 BPM | OXYGEN SATURATION: 99 %

## 2021-06-21 VITALS
TEMPERATURE: 97 F | RESPIRATION RATE: 16 BRPM | DIASTOLIC BLOOD PRESSURE: 86 MMHG | OXYGEN SATURATION: 95 % | SYSTOLIC BLOOD PRESSURE: 159 MMHG | HEART RATE: 65 BPM

## 2021-06-21 DIAGNOSIS — Z98.890 OTHER SPECIFIED POSTPROCEDURAL STATES: Chronic | ICD-10-CM

## 2021-06-21 DIAGNOSIS — C50.919 MALIGNANT NEOPLASM OF UNSPECIFIED SITE OF UNSPECIFIED FEMALE BREAST: ICD-10-CM

## 2021-06-21 LAB — ABO RH CONFIRMATION: SIGNIFICANT CHANGE UP

## 2021-06-21 PROCEDURE — 38900 IO MAP OF SENT LYMPH NODE: CPT | Mod: LT

## 2021-06-21 PROCEDURE — 38525 BIOPSY/REMOVAL LYMPH NODES: CPT | Mod: LT

## 2021-06-21 PROCEDURE — 76098 X-RAY EXAM SURGICAL SPECIMEN: CPT

## 2021-06-21 PROCEDURE — 38792 RA TRACER ID OF SENTINL NODE: CPT

## 2021-06-21 PROCEDURE — C1889: CPT

## 2021-06-21 PROCEDURE — 88333 PATH CONSLTJ SURG CYTO XM 1: CPT | Mod: 26

## 2021-06-21 PROCEDURE — 76098 X-RAY EXAM SURGICAL SPECIMEN: CPT | Mod: 26

## 2021-06-21 PROCEDURE — 19301 PARTIAL MASTECTOMY: CPT | Mod: LT

## 2021-06-21 PROCEDURE — 36415 COLL VENOUS BLD VENIPUNCTURE: CPT

## 2021-06-21 PROCEDURE — 38792 RA TRACER ID OF SENTINL NODE: CPT | Mod: LT,59

## 2021-06-21 PROCEDURE — 88307 TISSUE EXAM BY PATHOLOGIST: CPT | Mod: 26

## 2021-06-21 PROCEDURE — 88333 PATH CONSLTJ SURG CYTO XM 1: CPT

## 2021-06-21 PROCEDURE — 88307 TISSUE EXAM BY PATHOLOGIST: CPT

## 2021-06-21 PROCEDURE — A9541: CPT

## 2021-06-21 RX ORDER — FENTANYL CITRATE 50 UG/ML
25 INJECTION INTRAVENOUS
Refills: 0 | Status: DISCONTINUED | OUTPATIENT
Start: 2021-06-21 | End: 2021-06-21

## 2021-06-21 RX ORDER — ALPRAZOLAM 0.25 MG
1 TABLET ORAL
Qty: 0 | Refills: 0 | DISCHARGE

## 2021-06-21 RX ORDER — ONDANSETRON 8 MG/1
4 TABLET, FILM COATED ORAL ONCE
Refills: 0 | Status: DISCONTINUED | OUTPATIENT
Start: 2021-06-21 | End: 2021-06-21

## 2021-06-21 RX ORDER — SODIUM CHLORIDE 9 MG/ML
1000 INJECTION, SOLUTION INTRAVENOUS
Refills: 0 | Status: DISCONTINUED | OUTPATIENT
Start: 2021-06-21 | End: 2021-06-21

## 2021-06-21 RX ORDER — TRAMADOL HYDROCHLORIDE 50 MG/1
1 TABLET ORAL
Qty: 12 | Refills: 0
Start: 2021-06-21 | End: 2021-06-24

## 2021-06-21 RX ORDER — SODIUM CHLORIDE 9 MG/ML
3 INJECTION INTRAMUSCULAR; INTRAVENOUS; SUBCUTANEOUS ONCE
Refills: 0 | Status: DISCONTINUED | OUTPATIENT
Start: 2021-06-21 | End: 2021-06-21

## 2021-06-21 RX ADMIN — Medication 100 MILLIGRAM(S): at 12:45

## 2021-06-21 RX ADMIN — FENTANYL CITRATE 25 MICROGRAM(S): 50 INJECTION INTRAVENOUS at 15:55

## 2021-06-21 NOTE — ASU DISCHARGE PLAN (ADULT/PEDIATRIC) - CARE PROVIDER_API CALL
Lora Dorsey)  Surgery  [address], [place] [address]  Phone: [phone]  Fax: [phone]  Established Patient  Follow Up Time: 2 weeks

## 2021-06-21 NOTE — ASU DISCHARGE PLAN (ADULT/PEDIATRIC) - CALL YOUR DOCTOR IF YOU HAVE ANY OF THE FOLLOWING:
Bleeding that does not stop/Pain not relieved by Medications/Fever greater than (need to indicate Fahrenheit or Celsius)/Wound/Surgical Site with redness, or foul smelling discharge or pus Bleeding that does not stop/Pain not relieved by Medications/Fever greater than (need to indicate Fahrenheit or Celsius)/Wound/Surgical Site with redness, or foul smelling discharge or pus/Nausea and vomiting that does not stop/Increased irritability or sluggishness

## 2021-06-22 NOTE — ASU PREOP CHECKLIST - HEIGHT IN FEET
PATIENT CALLED STATING SHE RECEIVED A CALL FROM [facility] TO SCHEDULE HER MRI DR ESYMOUR ORDERED. PATIENT STATED SHE DOES NOT WANT TO GO TO [facility] FOR THE MRI [facility] [address] PATIENT IS REQUESTING TO SPEAK TO  [facility].  CALL BACK NUMBER [phone]
PER [facility] DEPARTMENT PATIENT IS [facility] FOR NECK AND THORACIC DDD.
5

## 2021-06-28 LAB — SURGICAL PATHOLOGY STUDY: SIGNIFICANT CHANGE UP

## 2021-07-01 ENCOUNTER — APPOINTMENT (OUTPATIENT)
Dept: SURGERY | Facility: CLINIC | Age: 71
End: 2021-07-01
Payer: MEDICARE

## 2021-07-01 VITALS
WEIGHT: 132 LBS | OXYGEN SATURATION: 99 % | HEIGHT: 61 IN | HEART RATE: 82 BPM | BODY MASS INDEX: 24.92 KG/M2 | SYSTOLIC BLOOD PRESSURE: 150 MMHG | DIASTOLIC BLOOD PRESSURE: 72 MMHG | TEMPERATURE: 97.6 F

## 2021-07-01 PROCEDURE — 99024 POSTOP FOLLOW-UP VISIT: CPT

## 2021-07-01 NOTE — HISTORY OF PRESENT ILLNESS
[FreeTextEntry1] : I had the pleasure of seeing Kristie Gruber in the office for newly diagnosed triple negative left breast cancer for which she is s/p left breast savita  wide lumpectomy with sentinel node biopsy. Kristie is a celestine 71 yo postmenopausal female who is accompanied by her .\par \par She recently underwent bilateral screening mammogram 3/8/2021 which demonstrated a new mammographic mass at the 9:00 position of the left breast which was biopsied and demonstrated poorly differentiated invasive carcinoma ER- [place]- [id number] -\par \par She denies dominant breast mass, skin changes or nipple discharge. \par She has a family history including twin sister dx and  from esophageal carcinoma. \par \par Imaging: \par [facility]\par 3/18/2021\par Bilateral screening mammogram/tomosynthesis BIRADS 0: Needs additional imaging\par Impression: there is a 1cm circumscribed nodule in the left breast medially. \par 2021 Left breast ultrasound: Impression: 4c Left breast mass corresponding to mammographic finding. \par 2021 Left breast post biopsy mammogram\par Impression: Status post left breast ultrasound guided core biopsy. Pathology report in EMR- left breast triple negative grade 3 IDC.\par \par 2021 MR breast\par 1.0 cm malignancy in the central left breast.\par No MRI evidence of multicentric or contralateral disease. Study limited by moderate background enhancement.\par Nonspecific prominent left axillary lymph nodes which may be reactive in the setting of recent Covid vaccination. Left axillary ultrasound evaluation and sampling of the most suspicious lymph node can be performed if requested.\par RECOMMENDATION: Surgical or oncologic management.\par BI-RADS 6- Known Biopsy-Proven Malignancy\par \par 2021 US axilla only left\par Impression: No sonographically suspicious axillary lymph nodes. BIRADS 2 benign.\par \par We reviewed left axilla US and she understands the lymph nodes are not suspicious. She met with plastic and plastics will not be involved. Plan for left breast savita  lumpectomy with SLNB possible ALND. She will be scheduled for consultation with medical oncology for chemotherapy and radiation oncologist in [place] per patients request since it is close to home.\par \par \par She is healing well from surgery- we reviewed the final pathology at length and the recommendation for adjuvant systemic treatment and radiation. She understands the timing of treatment is crucial to decreasing locoregional recurrence and/or distant recurrence.\par \par Pathology: Left sentinel node 1. negative for carcinoma\par Left sentinel node 2 (0/2) negative for carcinoma\par Left breast savita wide lumpectomy: Invasive poorly differentiated carcinoma with DCIS. The invasive tumor measures 1.3cm one focus. All surgical resection margins are negative. the tumor is located 0.15 cm from anterior aspect of the resection, the closest margin; ER negative AR negative [id number] negative\par Anterior margin: negative\par Posterior deep margin: negative\par Lateral margin: negative\par Medial margin: negative\par Superior margin: negative\par Inferior margin: negative\par \par We reviewed and discussed surgical findings. She understands margins are clear and there is no evidence of metastatic disease. She also understands treatment recommendation will include chemotherapy to start 4-6 weeks after surgery followed by radiation.\par The names and numbers of referring doctors were provided to the patient as well as her  and consults with the medical and radiation doctors will be arranged.\par \par All questions were answered. \par \par \par

## 2021-07-01 NOTE — ASSESSMENT
[FreeTextEntry1] : 71 yo postmenopausal female presents for post operative visit s/p left breast savita wide lumpectomy with SLNB for triple negative breast cancer measuring 1.3cm grade 3 with DCIS and clear margins with 0/3 nodes.\par 1. Medical Oncology for chemotherapy\par 2. Radiation Oncology for radiation\par 3. Follow up in 8 weeks to monitor adjuvant treatment adherence and post operative healing

## 2021-07-01 NOTE — PHYSICAL EXAM
[Normocephalic] : normocephalic [Atraumatic] : atraumatic [EOMI] : extra ocular movement intact [PERRL] : pupils equal, round and reactive to light [Sclera nonicteric] : sclera nonicteric [Supple] : supple [No Supraclavicular Adenopathy] : no supraclavicular adenopathy [Examined in the supine and seated position] : examined in the supine and seated position [No dominant masses] : no dominant masses in right breast  [No dominant masses] : no dominant masses left breast [No Nipple Retraction] : no left nipple retraction [No Nipple Discharge] : no left nipple discharge [No Axillary Lymphadenopathy] : no left axillary lymphadenopathy [No Edema] : no edema [No Rashes] : no rashes [No Ulceration] : no ulceration [Breast Nipple Inversion] : nipples not inverted [Breast Nipple Retraction] : nipples not retracted [Breast Nipple Flattening] : nipples not flattened [Breast Nipple Fissures] : nipples not fissured [Breast Abnormal Lactation (Galactorrhea)] : no galactorrhea [Breast Abnormal Secretion Bloody Fluid] : no bloody discharge [Breast Abnormal Secretion Serous Fluid] : no serous discharge [Breast Abnormal Secretion Opalescent Fluid] : no milky discharge [de-identified] : No dominant breast mass, everted nipple without discharge.  [de-identified] : Healing well. no evidence of infection. Periareolar incision intact. Low axillary incision intact.

## 2021-07-13 ENCOUNTER — APPOINTMENT (OUTPATIENT)
Dept: PHYSICAL MEDICINE AND REHAB | Facility: CLINIC | Age: 71
End: 2021-07-13
Payer: MEDICARE

## 2021-07-13 DIAGNOSIS — Z91.89 OTHER SPECIFIED PERSONAL RISK FACTORS, NOT ELSEWHERE CLASSIFIED: ICD-10-CM

## 2021-07-13 DIAGNOSIS — R60.9 EDEMA, UNSPECIFIED: ICD-10-CM

## 2021-07-13 PROCEDURE — 93702 BIS XTRACELL FLUID ANALYSIS: CPT

## 2021-07-13 PROCEDURE — 99204 OFFICE O/P NEW MOD 45 MIN: CPT | Mod: 25

## 2021-07-13 NOTE — PHYSICAL EXAM
[FreeTextEntry1] : Gen: Patient is A&O x 3, NAD\par HEENT: EOMI, hearing grossly normal\par Resp: regular, non - labored\par CV: pulses regular\par Skin: no rashes, erythema\par Lymph: no clubbing, cyanosis, edema, or palpable lymphadenopathy\par Inspection: no instability or misalignment\par ROM: full throughout\par Palpation: TTP and hyperalgesia to left medial arm \par Sensation: Decreased in left medial arm\par Reflexes: 1+ and symmetric throughout\par Strength: 5/5 throughout\par Special tests: -Hawkin's sign \par Gait: normal, non-antalgic\par \par \par Bioimpedance spectroscopy performed today with L-Dex 4.2 in LUE (post-operative baseline).\par \par

## 2021-07-13 NOTE — HISTORY OF PRESENT ILLNESS
[FreeTextEntry1] : Ms. Gruber is a 70 year old female who is s/p left breast savita wide lumpectomy with SLNB for triple negative breast cancer measuring 1.3cm grade 3 with DCIS and clear margins with 0/3 nodes in June 2021.  She is planning to follow up with Medical Oncology and Radiation Oncology.  \par \par She reports that she has noticed having pain in her left medial arm since surgery.  She is unsure if there is any increase in swelling however she denies any at this time.  She also having some mild pain with overhead activities.  She describes the pain as burning and uncomfortable.  She also reports decreased sensation in left medial arm region.  Denies any pain radiating from her neck.

## 2021-07-13 NOTE — ASSESSMENT
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  Breast surgery and radiation oncology notes reviewed.\par -Discussed manual therapy and desensitization techniques however patient would like to defer at this time.\par \par #At risk for lymphedema\par -No clinical signs of edema or lymphedema on exam today.\par -Lymphedema education provided to patient.\par -Patient's previous reported symptoms were likely secondary to postoperative edema which appears to have resolved.\par -Bioimpedance spectroscopy performed today with L-Dex of 4.2, within normal limits.  Continue to closely monitor.\par \par Follow-up in 4 to 6 weeks.\par \par The patient had a baseline SOZO measurement, which I reviewed today. The score is 4.2 in LUE. Bioimpedance spectroscopy helps identify the onset of lymphedema in an arm or leg before patients experience noticeable swelling. Research has shown that the early detection of lymphedema using L-Dex combined with treatment can reduce progression to chronic lymphedema by 95% in breast cancer patients. Whenever possible, patients are tested for baseline L-Dex score before cancer treatment begins and then are reassessed during regular follow-up visits using the SOZO device. Otherwise, this can be started postoperatively and continued during regular follow-up visits. If the patient’s L-Dex score increases above normal levels, that is a sign that lymphedema is developing, and a referral is made to physical therapy for further evaluation and/or early compression treatment. Lymphedema assessment with the SOZO L-Dex score is recommended to be done every 3 months for the first 3 years and then every 6 months for years 4 and 5, followed by annually afterwards.\par

## 2021-07-14 NOTE — PHYSICAL EXAM
[Normocephalic] : normocephalic [Atraumatic] : atraumatic [EOMI] : extra ocular movement intact [PERRL] : pupils equal, round and reactive to light [Sclera nonicteric] : sclera nonicteric [Supple] : supple [No Supraclavicular Adenopathy] : no supraclavicular adenopathy [Examined in the supine and seated position] : examined in the supine and seated position [No dominant masses] : no dominant masses in right breast  [No dominant masses] : no dominant masses left breast [No Nipple Retraction] : no left nipple retraction [No Nipple Discharge] : no left nipple discharge [Breast Nipple Inversion] : nipples not inverted [Breast Nipple Retraction] : nipples not retracted [Breast Nipple Flattening] : nipples not flattened [Breast Nipple Fissures] : nipples not fissured [Breast Abnormal Lactation (Galactorrhea)] : no galactorrhea [Breast Abnormal Secretion Bloody Fluid] : no bloody discharge [Breast Abnormal Secretion Serous Fluid] : no serous discharge [Breast Abnormal Secretion Opalescent Fluid] : no milky discharge [No Axillary Lymphadenopathy] : no left axillary lymphadenopathy [No Edema] : no edema [No Rashes] : no rashes [No Ulceration] : no ulceration [de-identified] : No dominant breast mass, everted nipple without discharge.  [de-identified] : No dominant breast mass, everted nipple without discharge. Bruising at biopsy site.

## 2021-07-14 NOTE — HISTORY OF PRESENT ILLNESS
[FreeTextEntry1] : I had the pleasure of seeing Kristie Gruber in the office to discuss MRI breast and surgical planning.\par \par She recently underwent bilateral screening mammogram 3/8/2021 which demonstrated a new mammographic mass at the 9:00 position of the left breast which was biopsied and demonstrated poorly differentiated invasive carcinoma ER- [place]- [id number] -.  MR breast demonstrated the tumor to measure 1 cm.\par \par She denies dominant breast mass, skin changes or nipple discharge. \par She has a family history including twin sister dx and  from esophageal carcinoma. \par \par [facility] Genetic Results- No clinically significant mutation identified\par \par Imaging: \[facility]\par 3/18/2021\par Bilateral screening mammogram/tomosynthesis BIRADS 0: Needs additional imaging\par Impression: there is a 1cm circumscribed nodule in the left breast medially. \par 2021 Left breast ultrasound: Impression: 4c Left breast mass corresponding to mammographic finding. \par 2021 Left breast post biopsy mammogram\par Impression: Status post left breast ultrasound guided core biopsy. Pathology report in EMR- left breast triple negative grade 3 IDC.\par \par 2021  MR breast\par 1.0 cm malignancy in the central left breast.\par No MRI evidence of multicentric or contralateral disease. Study limited by moderate background enhancement.\par Nonspecific prominent left axillary lymph nodes which may be reactive in the setting of recent Covid vaccination. Left axillary ultrasound evaluation and sampling of the most suspicious lymph node can be performed if requested.\par RECOMMENDATION: Surgical or oncologic management.\par BI-RADS 6- Known Biopsy-Proven Malignancy\par \par We reviewed MR breast and she understands MR demonstrated the tumor to measure 1 cm.  She met with  about breast reconstruction options and is still unsure on her final surgical decision.  She will be undergoing axilla US later today.  Recommendation for follow up in 1 week, consult with radiation oncology, and axilla US.\par \par She understands and agrees with plan.  All questions answered.\par \par All questions were answered.\par

## 2021-07-16 ENCOUNTER — OUTPATIENT (OUTPATIENT)
Dept: OUTPATIENT SERVICES | Facility: HOSPITAL | Age: 71
LOS: 1 days | Discharge: HOME | End: 2021-07-16

## 2021-07-16 DIAGNOSIS — Z98.890 OTHER SPECIFIED POSTPROCEDURAL STATES: Chronic | ICD-10-CM

## 2021-07-19 ENCOUNTER — RESULT REVIEW (OUTPATIENT)
Age: 71
End: 2021-07-19

## 2021-07-20 LAB — SURGICAL PATHOLOGY STUDY: SIGNIFICANT CHANGE UP

## 2021-07-21 ENCOUNTER — RESULT REVIEW (OUTPATIENT)
Age: 71
End: 2021-07-21

## 2021-07-21 ENCOUNTER — APPOINTMENT (OUTPATIENT)
Dept: HEMATOLOGY ONCOLOGY | Facility: CLINIC | Age: 71
End: 2021-07-21
Payer: MEDICARE

## 2021-07-21 ENCOUNTER — NON-APPOINTMENT (OUTPATIENT)
Age: 71
End: 2021-07-21

## 2021-07-21 ENCOUNTER — APPOINTMENT (OUTPATIENT)
Dept: RADIATION ONCOLOGY | Facility: CLINIC | Age: 71
End: 2021-07-21

## 2021-07-21 VITALS
BODY MASS INDEX: 24.93 KG/M2 | HEART RATE: 84 BPM | OXYGEN SATURATION: 97 % | HEIGHT: 61 IN | SYSTOLIC BLOOD PRESSURE: 145 MMHG | TEMPERATURE: 98.3 F | DIASTOLIC BLOOD PRESSURE: 78 MMHG | WEIGHT: 132.06 LBS | RESPIRATION RATE: 18 BRPM

## 2021-07-21 LAB
BASOPHILS # BLD AUTO: 0.1 K/UL — SIGNIFICANT CHANGE UP (ref 0–0.2)
BASOPHILS NFR BLD AUTO: 1.6 % — SIGNIFICANT CHANGE UP (ref 0–2)
EOSINOPHIL # BLD AUTO: 0.5 K/UL — SIGNIFICANT CHANGE UP (ref 0–0.5)
EOSINOPHIL NFR BLD AUTO: 6.3 % — HIGH (ref 0–6)
HCT VFR BLD CALC: 39.2 % — SIGNIFICANT CHANGE UP (ref 34.5–45)
HGB BLD-MCNC: 12.7 G/DL — SIGNIFICANT CHANGE UP (ref 11.5–15.5)
LYMPHOCYTES # BLD AUTO: 2.4 K/UL — SIGNIFICANT CHANGE UP (ref 1–3.3)
LYMPHOCYTES # BLD AUTO: 28.3 % — SIGNIFICANT CHANGE UP (ref 13–44)
MCHC RBC-ENTMCNC: 31.8 PG — SIGNIFICANT CHANGE UP (ref 27–34)
MCHC RBC-ENTMCNC: 32.3 G/DL — SIGNIFICANT CHANGE UP (ref 32–36)
MCV RBC AUTO: 98.2 FL — SIGNIFICANT CHANGE UP (ref 80–100)
MONOCYTES # BLD AUTO: 0.6 K/UL — SIGNIFICANT CHANGE UP (ref 0–0.9)
MONOCYTES NFR BLD AUTO: 7 % — SIGNIFICANT CHANGE UP (ref 2–14)
NEUTROPHILS # BLD AUTO: 4.8 K/UL — SIGNIFICANT CHANGE UP (ref 1.8–7.4)
NEUTROPHILS NFR BLD AUTO: 56.9 % — SIGNIFICANT CHANGE UP (ref 43–77)
PLATELET # BLD AUTO: 279 K/UL — SIGNIFICANT CHANGE UP (ref 150–400)
RBC # BLD: 4 M/UL — SIGNIFICANT CHANGE UP (ref 3.8–5.2)
RBC # FLD: 11.3 % — SIGNIFICANT CHANGE UP (ref 10.3–14.5)
WBC # BLD: 8.4 K/UL — SIGNIFICANT CHANGE UP (ref 3.8–10.5)
WBC # FLD AUTO: 8.4 K/UL — SIGNIFICANT CHANGE UP (ref 3.8–10.5)

## 2021-07-21 PROCEDURE — 99205 OFFICE O/P NEW HI 60 MIN: CPT

## 2021-07-21 RX ORDER — ZOLPIDEM TARTRATE 5 MG/1
5 TABLET ORAL
Qty: 30 | Refills: 0 | Status: DISCONTINUED | COMMUNITY
Start: 2021-06-02 | End: 2021-07-21

## 2021-07-21 RX ORDER — ALPRAZOLAM 0.25 MG/1
0.25 TABLET ORAL
Qty: 30 | Refills: 0 | Status: DISCONTINUED | COMMUNITY
Start: 2021-04-20 | End: 2021-07-21

## 2021-07-21 RX ORDER — ZOLPIDEM TARTRATE 5 MG/1
5 TABLET ORAL
Qty: 30 | Refills: 0 | Status: DISCONTINUED | COMMUNITY
Start: 2021-04-20 | End: 2021-07-21

## 2021-07-26 NOTE — PAST MEDICAL HISTORY
[Increasing age ( >40 years old)] : Increasing age ( >40 years old) [Obesity: BMI >25] : Obesity: BMI >25 [Malignancy] : Malignancy [No therapy indicated for cases scheduled for less than one hour] : No therapy indicated for cases scheduled for less than one hour.

## 2021-07-27 RX ORDER — FLUCONAZOLE 150 MG/1
150 TABLET ORAL
Qty: 1 | Refills: 0 | Status: DISCONTINUED | COMMUNITY
Start: 2020-11-19 | End: 2021-07-27

## 2021-07-27 RX ORDER — LIFITEGRAST 50 MG/ML
5 SOLUTION/ DROPS OPHTHALMIC
Qty: 60 | Refills: 0 | Status: DISCONTINUED | COMMUNITY
Start: 2020-11-19 | End: 2021-07-27

## 2021-07-27 RX ORDER — DOXYCYCLINE HYCLATE 100 MG/1
100 TABLET ORAL
Qty: 28 | Refills: 0 | Status: DISCONTINUED | COMMUNITY
Start: 2020-11-19 | End: 2021-07-27

## 2021-07-27 RX ORDER — TRIAMCINOLONE ACETONIDE 0.25 MG/G
0.03 OINTMENT TOPICAL 3 TIMES DAILY
Qty: 1 | Refills: 3 | Status: DISCONTINUED | COMMUNITY
Start: 2020-01-09 | End: 2021-07-27

## 2021-07-28 ENCOUNTER — APPOINTMENT (OUTPATIENT)
Dept: INTERVENTIONAL RADIOLOGY/VASCULAR | Facility: CLINIC | Age: 71
End: 2021-07-28
Payer: MEDICARE

## 2021-07-28 ENCOUNTER — OUTPATIENT (OUTPATIENT)
Dept: OUTPATIENT SERVICES | Facility: HOSPITAL | Age: 71
LOS: 1 days | End: 2021-07-28

## 2021-07-28 VITALS
OXYGEN SATURATION: 99 % | TEMPERATURE: 97 F | RESPIRATION RATE: 18 BRPM | SYSTOLIC BLOOD PRESSURE: 137 MMHG | DIASTOLIC BLOOD PRESSURE: 87 MMHG | HEART RATE: 74 BPM

## 2021-07-28 DIAGNOSIS — Z98.890 OTHER SPECIFIED POSTPROCEDURAL STATES: Chronic | ICD-10-CM

## 2021-07-28 DIAGNOSIS — C50.912 MALIGNANT NEOPLASM OF UNSPECIFIED SITE OF LEFT FEMALE BREAST: ICD-10-CM

## 2021-07-28 PROCEDURE — 76937 US GUIDE VASCULAR ACCESS: CPT | Mod: 26

## 2021-07-28 PROCEDURE — 36561 INSERT TUNNELED CV CATH: CPT

## 2021-07-28 PROCEDURE — 77001 FLUOROGUIDE FOR VEIN DEVICE: CPT | Mod: 26

## 2021-07-28 NOTE — HISTORY OF PRESENT ILLNESS
[FreeTextEntry1] : PRE CALL PRIOR TO APPOINTMENT: \par \par \par Phone Number: [phone]\par \par COVID-19 SWAB: VACCINATED \par \par RX on file:  YES \par \par Referring MD: NARDA \par \par Appointment date: 7/28/2021 \par \par Currently on Chemotherapy: NO              \par \par CBC within 48 hours:  N/A NOT ON CHEMO \par \par Diabetic: NO\par \par Clotting or Bleeding disorders: NO\par \par PPM / Defibrillator: NO \par \par NPO status advised: YES \par \par History of fall: NO    \par \par Assistant device for walking: NO \par \par  home: - SEN \par \par Blood Thinners: NO \par \par Prescribing MD agree to have medication held: N/A\par \par Capacity to make decisions: YES \par \par HCP: YES \par \par DNR: NO \par \par Person contact for Pre-Call: PATIENT - BY DG\par \par \par \par \par Anesthesia Screening: \par \par \par Guidelines for Screening Anesthesia / Sedation Cases	(TYPE YES OR NO) \par  \par Obtain Prescription / Authorization from Referring Physician: YES	\par \par Medical Necessity (Justification of the need for Anesthesia / Sedation) from referring Physician: YES	\par \par Have you taken oral sedation? (e.g. Xanax, Valium, and other oral sedatives):  NO	\par \par Clearance to receive Anesthesia / Sedation: YES- BY Dr. Flood 	\par \par \par \par ANESTHESIA EXCLUSION CRITERIA QUESTIONNAIRE:	\par 	\par Difficult Airway: (TYPE YES OR NO) 	\par          \par Previous Problem with Anesthesia or sedation: NO	\par \par History of Difficult Intubation: NO	\par \par Stridor, Snoring, Sleep Apnea: NO	\par \par Tonsils / Adenoids present: NO\par \par Dysmorphic Facial Features: NO	\par \par Cervical Spine Fusion/ Cervical Spine Surgery: NO	\par \par Tumor in Airway: NO	\par \par Trauma to Airway: NO	\par \par Radiation therapy to head / neck: NO	\par \par Advanced Rheumatoid Arthritis: NO	\par \par Active Cardiac Ischemia (as defined by EKG or Laboratory  Examination): NO	\par \par  Severe COPD / Home O2: NO	\par \par  Known or Suspected Increased Intracranial pressure: NO	\par \par Patient with BMI of 40 or greater : NO    Weight (kgs.) _60KG____ Height (ft.) _5'1"_____       BMI_24.95______	 	\par Patients with Increased Risk of Aspiration: (TYPE YES OR NO) 		\par \par Abnormal Airway: NO	\par \par Hiatal Hernia with Reflux: NO	\par           \par Pregnancy: NO	 	\par           \par Altered Mental State: NO 	 	\par           \par Spinal Cord Injury with Paraplegia or Quadriplegia: NO	 	\par        \par History of delayed Gastric Emptying: NO 	 	\par          \par  ASA Physical Status of 3 or greater (See Appendix 1 from policy ANSL.5502) 	 	\par  	\par \par Malignant Hyperthermia Screening: (TYPE YES OR NO) 	\par           \par Family history unexpected death following anesthesia or exercise: NO	\par          \par  Family or personal history of Malignant Hyperthermia: NO  	\par          \par  A muscle or neuromuscular disorder: NO \par \par Does the patient have a history of any of the following (Type YES or NO)\par \par Malignant hyperthermia: NO\par \par A muscle or neuromuscular disorder: NO \par \par High temperature following exercise: NO\par \par Personal history of: (Type YES or NO)\par \par Muscle spasm: NO\par \par Dark or chocolate-colored urine: NO\par \par Unanticipated fever immediately following anesthesia or exercise: NO	\par \par \par \par Angélica -Operative Assessment  (Day of Procedure) \par \par NPO status: 7/27/2021 1030p\par \par Accompanied by:  \par \par Falls risk: NO \par \par Labs: In Chart - Reviewed \par \par IVL: Right AC \par \par IR MD: Dr. Gabriela Aguilar \par \par Urine Pregnancy: N/A TUBAL LIGATION \par \par Pre-Op instructions: YES  \par \par POST-OP teaching initiated: YES \par \par Allergy bracelet on: NKA \par \par Anesthesia plan discussed with IR MD: \par \par Antibiotic given: \par \par

## 2021-07-28 NOTE — HISTORY OF PRESENT ILLNESS
[FreeTextEntry1] : PRE CALL PRIOR TO APPOINTMENT: \par \par \par Phone Number: [phone]\par \par COVID-19 SWAB: VACCINATED \par \par RX on file:  YES \par \par Referring MD: NARDA \par \par Appointment date: 7/28/2021 \par \par Currently on Chemotherapy: NO              \par \par CBC within 48 hours:  N/A NOT ON CHEMO \par \par Diabetic: NO\par \par Clotting or Bleeding disorders: NO\par \par PPM / Defibrillator: NO \par \par NPO status advised: YES \par \par History of fall: NO    \par \par Assistant device for walking: NO \par \par  home: - SEN \par \par Blood Thinners: NO \par \par Prescribing MD agree to have medication held: N/A\par \par Capacity to make decisions: YES \par \par HCP: YES \par \par DNR: NO \par \par Person contact for Pre-Call: PATIENT - BY DG\par \par \par \par \par Anesthesia Screening: \par \par \par Guidelines for Screening Anesthesia / Sedation Cases	(TYPE YES OR NO) \par  \par Obtain Prescription / Authorization from Referring Physician: YES	\par \par Medical Necessity (Justification of the need for Anesthesia / Sedation) from referring Physician: YES	\par \par Have you taken oral sedation? (e.g. Xanax, Valium, and other oral sedatives):  NO	\par \par Clearance to receive Anesthesia / Sedation: YES- BY Dr. Flood 	\par \par \par \par ANESTHESIA EXCLUSION CRITERIA QUESTIONNAIRE:	\par 	\par Difficult Airway: (TYPE YES OR NO) 	\par          \par Previous Problem with Anesthesia or sedation: NO	\par \par History of Difficult Intubation: NO	\par \par Stridor, Snoring, Sleep Apnea: NO	\par \par Tonsils / Adenoids present: NO\par \par Dysmorphic Facial Features: NO	\par \par Cervical Spine Fusion/ Cervical Spine Surgery: NO	\par \par Tumor in Airway: NO	\par \par Trauma to Airway: NO	\par \par Radiation therapy to head / neck: NO	\par \par Advanced Rheumatoid Arthritis: NO	\par \par Active Cardiac Ischemia (as defined by EKG or Laboratory  Examination): NO	\par \par  Severe COPD / Home O2: NO	\par \par  Known or Suspected Increased Intracranial pressure: NO	\par \par Patient with BMI of 40 or greater : NO    Weight (kgs.) _60KG____ Height (ft.) _5'1"_____       BMI_24.95______	 	\par Patients with Increased Risk of Aspiration: (TYPE YES OR NO) 		\par \par Abnormal Airway: NO	\par \par Hiatal Hernia with Reflux: NO	\par           \par Pregnancy: NO	 	\par           \par Altered Mental State: NO 	 	\par           \par Spinal Cord Injury with Paraplegia or Quadriplegia: NO	 	\par        \par History of delayed Gastric Emptying: NO 	 	\par          \par  ASA Physical Status of 3 or greater (See Appendix 1 from policy ANSL.5502) 	 	\par  	\par \par Malignant Hyperthermia Screening: (TYPE YES OR NO) 	\par           \par Family history unexpected death following anesthesia or exercise: NO	\par          \par  Family or personal history of Malignant Hyperthermia: NO  	\par          \par  A muscle or neuromuscular disorder: NO \par \par Does the patient have a history of any of the following (Type YES or NO)\par \par Malignant hyperthermia: NO\par \par A muscle or neuromuscular disorder: NO \par \par High temperature following exercise: NO\par \par Personal history of: (Type YES or NO)\par \par Muscle spasm: NO\par \par Dark or chocolate-colored urine: NO\par \par Unanticipated fever immediately following anesthesia or exercise: NO	\par \par \par \par Angélica -Operative Assessment  (Day of Procedure) \par \par NPO status: 7/27/2021 1030p\par \par Accompanied by:  \par \par Falls risk: NO \par \par Labs: In Chart - Reviewed \par \par IVL: Right AC \par \par IR MD: Dr. Gabriela Aguilar \par \par Urine Pregnancy: N/A TUBAL LIGATION \par \par Pre-Op instructions: YES  \par \par POST-OP teaching initiated: YES \par \par Allergy bracelet on: NKA \par \par Anesthesia plan discussed with IR MD: \par \par Antibiotic given: \par \par

## 2021-07-30 LAB
ALBUMIN SERPL ELPH-MCNC: 4.6 G/DL
ALP BLD-CCNC: 93 U/L
ALT SERPL-CCNC: 13 U/L
ANION GAP SERPL CALC-SCNC: 10 MMOL/L
APTT BLD: 33.5 SEC
AST SERPL-CCNC: 17 U/L
BILIRUB SERPL-MCNC: 0.2 MG/DL
BUN SERPL-MCNC: 13 MG/DL
CALCIUM SERPL-MCNC: 9.9 MG/DL
CHLORIDE SERPL-SCNC: 104 MMOL/L
CO2 SERPL-SCNC: 27 MMOL/L
CREAT SERPL-MCNC: 0.91 MG/DL
HBV CORE IGM SER QL: NONREACTIVE
HBV SURFACE AB SER QL: REACTIVE
HBV SURFACE AB SERPL IA-ACNC: 62 MIU/ML
HBV SURFACE AG SER QL: NONREACTIVE
HCV AB SER QL: NONREACTIVE
HCV S/CO RATIO: 0.11 S/CO
INR PPP: 1.04 RATIO
MAGNESIUM SERPL-MCNC: 2.1 MG/DL
POTASSIUM SERPL-SCNC: 4.6 MMOL/L
PROT SERPL-MCNC: 6.9 G/DL
PT BLD: 12.3 SEC
SODIUM SERPL-SCNC: 141 MMOL/L

## 2021-07-30 NOTE — CONSULT LETTER
[Dear  ___] : Dear  [unfilled], [Consult Letter:] : I had the pleasure of evaluating your patient, [unfilled]. [Please see my note below.] : Please see my note below. [Consult Closing:] : Thank you very much for allowing me to participate in the care of this patient.  If you have any questions, please do not hesitate to contact me. [Sincerely,] : Sincerely, [DrRosario  ___] : Dr. AYALA [FreeTextEntry3] : Yulia Hairston MD\par Medical Oncology/Hematology\par [facility] [place], [facility], [facility]\par \par \par [facility]\par

## 2021-07-30 NOTE — ASSESSMENT
[FreeTextEntry1] : 69 y/o postmenopausal female presents newly diagnosed triple negative breast cancer measuring 1.3cm grade 3 with DCIS and clear margins with 0/3 nodes. Underwent left breast savita  wide lumpectomy on 06/28/21. \par \par Today we discussed the available radiographic and pathologic data in detail. We also discussed the natural history of the disease, as well as management options. Her breast cancer is triple negative, and we discussed AC-T vs. TC.  We reviewed side effects of chemotherapy to include but not limited to fatigue, nausea, vomiting, diarrhea, constipation, liver/kidney dysfunction, neutropenia, myelosuppression, alopecia, increased risk of infections, neuropathy.  Discussed risk of cardiotoxicity and secondary leukemia with Adriamycin.\par \par Discussed dignicap cooling system to reduce risk of hair loss. \par \par She's very clear that she wants to be here less frequently for chemotherapy and prefers TC for it's shorter duration, however will think over both options and let me know how she wants to proceed. \par \par \par Plan:\par Echo ordered \par Pre chemo labs ordered\par Practice RN chemo teaching performed\par Follow up 10 days after C1

## 2021-07-30 NOTE — PHYSICAL EXAM
[Normal] : affect appropriate [de-identified] : suppple [de-identified] : CTA [de-identified] : S1/S2 [de-identified] : No edema

## 2021-07-30 NOTE — HISTORY OF PRESENT ILLNESS
[de-identified] : Ms. Gruber is a 70 year old female newly diagnosed with triple negative left breast cancer at age 70. She recently underwent bilateral screening mammogram 3/18/2021 which demonstrated a new mammographic mass at the 9:00 position of the left breast which was biopsied on 2021 and demonstrated poorly differentiated invasive carcinoma ER- [id number]- [id number] -\par \par 2021 Bilateral screening mammogram \par Left breast nodule\par Left breast sonography is recommended: BI-RADS-0: Incomplete: Needs Additional Imaging evaluation\par \par 2021 DEXA \par Osteopetrosis category with the lowest T score -2/9 within the lumbar spine\par \par 2021 Breast ultrasound Left Complete\par 9:00 5cm from the nipple, corresponding to the new mammographic mass there is a hypoechoic mass with irregular borders measuring 9 X 8 X 2mm cyst.\par Ultrasound- guided core biopsy is recommended\par There is a left axillary lymph node questionable mildly thickened cortex. Patient recently had a COVID vaccine and this may be reactive \par BI-RADS-4: Suspicious Findings 4-C high suspicion for malignancy \par \par 2021 Underwent Breast, left, 9 o'clock 5 cm FN, core biopsy\par - Invasive poorly differentiated ductal carcinoma\par - Farhad score 8/9 (3 + 3 + 2) in this limited material\par - Invasive tumor measures at least 0.7 cm\par - Ductal carcinoma in situ absent\par - Microcalcifications absent\par - Lymphovascular permeation by tumor not seen\par - As per outside pathology report:\par ER:  Negative 0% nuclear staining\par IL:  Negative 0% nuclear staining\par Her-2:  Negative (0 membrane staining)\par \par 2021 MR Breast w/wo IV Cont, Bilateral w/CAD \par 1.0 cm malignancy in the central left breast.\par No MRI evidence of multicentric or contralateral disease.  Study limited by moderate background enhancement.\par Nonspecific prominent left axillary lymph nodes which may be reactive in the setting of recent Covid vaccination.  Left axillary ultrasound evaluation and sampling of the most suspicious lymph node can be performed if requested.\par RECOMMENDATION:  Surgical or oncologic management. BI-RADS 6- Known Biopsy-Proven Malignancy\par \par 2021 US Axilla Only, Left             \par FINDINGS: No suspicious axillary lymph nodes are noted. A nonspecific benign-appearing 1.9 x 0.6 x 1.2 cm axillary lymph node is identified.\par IMPRESSION:No sonographically suspicious axillary lymph nodes.\par Surgical management of the known left breast cancer remains recommended.\par RECOMMENDATION: Surgical or oncologic management.BI-RADS 2 - Benign Finding(s)\par \par 21 Underwent left breast savita  wide lumpectomy with sentinel node biopsy.\par Surgical Final Report\par Final Diagnosis\par 1     Left sentinel lymph node #2 blue:\par - One lymph node, negative for metastatic carcinoma (0/1).\par 2     Left sentinel lymph node #1:\par - Two lymph node, negative for metastatic carcinoma (0/2).\par 3     Left breast savita wide lumpectomy:\par -        Invasive poorly differentiated mammary ductal carcinoma\par with apocrine features, [place] score 8/9 (3 + 3 + 2),\par adjacent to previous biopsy cavity.\par -       The  invasive tumor measures measuring 1.3 cm, one focus.\par -       Ductal carcinoma in situ, cribriform, low to\par intermediate nuclear grade, without calcifications or necrosis.\par -       All surgical resection margins are negative, the\par tumor is located 0.15 cm from anterior aspect of the resection,\par the closest margin; see also the the following resection\par margins(4-9).\par 4     Left breast deep posterior margin:\par - Breast tissue, negative for dysplasia/neoplasia.\par 5     Left breast anterior margin:\par - Breast tissue, negative for dysplasia/neoplasia.\par 6     Left breast lateral margin:\par - Breast tissue, negative for dysplasia/neoplasia.\par 7     Left breast superior margin:\par - Breast tissue, negative for dysplasia/neoplasia. Minute\par focus of radial scar, completely excised.\par 8     Left breast inferior margin;\par - Breast tissue, negative for dysplasia/neoplasia.\par 9     Left breast medial margin;\par - Breast tissue, negative for dysplasia/neoplasia.\par \par Final Diagnosis\par Left breast, Lumpectomy ( one slide) 1 slide, [id number], HE3E)\par - Invasive poorly differentiated ductal carcinoma with apocrine\par features ( see comment for further details)\par - As per report tumor is triple negative ( ER, [place] and Her-2\par Negative)\par Comment :\par Only one slide is provided for review. Margin of the specimen in\par this one slide is negative for carcinoma. 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

## 2021-08-06 ENCOUNTER — APPOINTMENT (OUTPATIENT)
Age: 71
End: 2021-08-06

## 2021-08-06 ENCOUNTER — RESULT REVIEW (OUTPATIENT)
Age: 71
End: 2021-08-06

## 2021-08-06 ENCOUNTER — APPOINTMENT (OUTPATIENT)
Dept: HEMATOLOGY ONCOLOGY | Facility: CLINIC | Age: 71
End: 2021-08-06

## 2021-08-06 ENCOUNTER — LABORATORY RESULT (OUTPATIENT)
Age: 71
End: 2021-08-06

## 2021-08-06 LAB
BASOPHILS # BLD AUTO: 0.1 K/UL — SIGNIFICANT CHANGE UP (ref 0–0.2)
BASOPHILS NFR BLD AUTO: 0.7 % — SIGNIFICANT CHANGE UP (ref 0–2)
EOSINOPHIL # BLD AUTO: 0 K/UL — SIGNIFICANT CHANGE UP (ref 0–0.5)
EOSINOPHIL NFR BLD AUTO: 0.1 % — SIGNIFICANT CHANGE UP (ref 0–6)
HCT VFR BLD CALC: 40 % — SIGNIFICANT CHANGE UP (ref 34.5–45)
HGB BLD-MCNC: 13.1 G/DL — SIGNIFICANT CHANGE UP (ref 11.5–15.5)
LYMPHOCYTES # BLD AUTO: 0.9 K/UL — LOW (ref 1–3.3)
LYMPHOCYTES # BLD AUTO: 6.7 % — LOW (ref 13–44)
MCHC RBC-ENTMCNC: 32.2 PG — SIGNIFICANT CHANGE UP (ref 27–34)
MCHC RBC-ENTMCNC: 32.7 G/DL — SIGNIFICANT CHANGE UP (ref 32–36)
MCV RBC AUTO: 98.3 FL — SIGNIFICANT CHANGE UP (ref 80–100)
MONOCYTES # BLD AUTO: 0.4 K/UL — SIGNIFICANT CHANGE UP (ref 0–0.9)
MONOCYTES NFR BLD AUTO: 2.6 % — SIGNIFICANT CHANGE UP (ref 2–14)
NEUTROPHILS # BLD AUTO: 12.5 K/UL — HIGH (ref 1.8–7.4)
NEUTROPHILS NFR BLD AUTO: 89.9 % — HIGH (ref 43–77)
PLATELET # BLD AUTO: 318 K/UL — SIGNIFICANT CHANGE UP (ref 150–400)
RBC # BLD: 4.07 M/UL — SIGNIFICANT CHANGE UP (ref 3.8–5.2)
RBC # FLD: 11.4 % — SIGNIFICANT CHANGE UP (ref 10.3–14.5)
WBC # BLD: 13.9 K/UL — HIGH (ref 3.8–10.5)
WBC # FLD AUTO: 13.9 K/UL — HIGH (ref 3.8–10.5)

## 2021-08-09 VITALS — BODY MASS INDEX: 25.19 KG/M2 | WEIGHT: 133.44 LBS | HEIGHT: 61 IN

## 2021-08-09 DIAGNOSIS — C50.919 MALIGNANT NEOPLASM OF UNSPECIFIED SITE OF UNSPECIFIED FEMALE BREAST: ICD-10-CM

## 2021-08-09 DIAGNOSIS — Z51.89 ENCOUNTER FOR OTHER SPECIFIED AFTERCARE: ICD-10-CM

## 2021-08-09 DIAGNOSIS — Z51.11 ENCOUNTER FOR ANTINEOPLASTIC CHEMOTHERAPY: ICD-10-CM

## 2021-08-09 DIAGNOSIS — R11.2 NAUSEA WITH VOMITING, UNSPECIFIED: ICD-10-CM

## 2021-08-10 ENCOUNTER — NON-APPOINTMENT (OUTPATIENT)
Age: 71
End: 2021-08-10

## 2021-08-10 DIAGNOSIS — M89.8X9 OTHER SPECIFIED DISORDERS OF BONE, UNSPECIFIED SITE: ICD-10-CM

## 2021-08-13 ENCOUNTER — NON-APPOINTMENT (OUTPATIENT)
Age: 71
End: 2021-08-13

## 2021-08-24 RX ORDER — FLUOCINOLONE ACETONIDE 0.25 MG/G
0.03 CREAM TOPICAL TWICE DAILY
Qty: 15 | Refills: 1 | Status: ACTIVE | COMMUNITY
Start: 2021-08-24 | End: 1900-01-01

## 2021-08-25 ENCOUNTER — OUTPATIENT (OUTPATIENT)
Dept: OUTPATIENT SERVICES | Facility: HOSPITAL | Age: 71
LOS: 1 days | Discharge: HOME | End: 2021-08-25

## 2021-08-25 DIAGNOSIS — Z98.890 OTHER SPECIFIED POSTPROCEDURAL STATES: Chronic | ICD-10-CM

## 2021-08-25 DIAGNOSIS — C50.919 MALIGNANT NEOPLASM OF UNSPECIFIED SITE OF UNSPECIFIED FEMALE BREAST: ICD-10-CM

## 2021-08-26 ENCOUNTER — APPOINTMENT (OUTPATIENT)
Dept: SURGERY | Facility: CLINIC | Age: 71
End: 2021-08-26
Payer: MEDICARE

## 2021-08-26 ENCOUNTER — RESULT REVIEW (OUTPATIENT)
Age: 71
End: 2021-08-26

## 2021-08-26 ENCOUNTER — APPOINTMENT (OUTPATIENT)
Dept: HEMATOLOGY ONCOLOGY | Facility: CLINIC | Age: 71
End: 2021-08-26
Payer: MEDICARE

## 2021-08-26 VITALS
TEMPERATURE: 98.3 F | OXYGEN SATURATION: 97 % | HEART RATE: 84 BPM | BODY MASS INDEX: 24.92 KG/M2 | HEIGHT: 61 IN | SYSTOLIC BLOOD PRESSURE: 106 MMHG | DIASTOLIC BLOOD PRESSURE: 68 MMHG | WEIGHT: 132 LBS

## 2021-08-26 VITALS
HEIGHT: 61 IN | WEIGHT: 133.31 LBS | BODY MASS INDEX: 25.17 KG/M2 | SYSTOLIC BLOOD PRESSURE: 129 MMHG | DIASTOLIC BLOOD PRESSURE: 76 MMHG | RESPIRATION RATE: 18 BRPM | OXYGEN SATURATION: 98 % | TEMPERATURE: 98.3 F | HEART RATE: 84 BPM

## 2021-08-26 DIAGNOSIS — Z98.890 OTHER SPECIFIED POSTPROCEDURAL STATES: ICD-10-CM

## 2021-08-26 LAB
BASOPHILS # BLD AUTO: 0.2 K/UL — SIGNIFICANT CHANGE UP (ref 0–0.2)
BASOPHILS NFR BLD AUTO: 2.2 % — HIGH (ref 0–2)
EOSINOPHIL # BLD AUTO: 0.1 K/UL — SIGNIFICANT CHANGE UP (ref 0–0.5)
EOSINOPHIL NFR BLD AUTO: 1.1 % — SIGNIFICANT CHANGE UP (ref 0–6)
HCT VFR BLD CALC: 36.1 % — SIGNIFICANT CHANGE UP (ref 34.5–45)
HGB BLD-MCNC: 11.7 G/DL — SIGNIFICANT CHANGE UP (ref 11.5–15.5)
LYMPHOCYTES # BLD AUTO: 1.3 K/UL — SIGNIFICANT CHANGE UP (ref 1–3.3)
LYMPHOCYTES # BLD AUTO: 16.3 % — SIGNIFICANT CHANGE UP (ref 13–44)
MCHC RBC-ENTMCNC: 31.9 PG — SIGNIFICANT CHANGE UP (ref 27–34)
MCHC RBC-ENTMCNC: 32.4 G/DL — SIGNIFICANT CHANGE UP (ref 32–36)
MCV RBC AUTO: 98.4 FL — SIGNIFICANT CHANGE UP (ref 80–100)
MONOCYTES # BLD AUTO: 0.7 K/UL — SIGNIFICANT CHANGE UP (ref 0–0.9)
MONOCYTES NFR BLD AUTO: 8.8 % — SIGNIFICANT CHANGE UP (ref 2–14)
NEUTROPHILS # BLD AUTO: 5.8 K/UL — SIGNIFICANT CHANGE UP (ref 1.8–7.4)
NEUTROPHILS NFR BLD AUTO: 71.6 % — SIGNIFICANT CHANGE UP (ref 43–77)
PLATELET # BLD AUTO: 464 K/UL — HIGH (ref 150–400)
RBC # BLD: 3.66 M/UL — LOW (ref 3.8–5.2)
RBC # FLD: 12.4 % — SIGNIFICANT CHANGE UP (ref 10.3–14.5)
WBC # BLD: 8 K/UL — SIGNIFICANT CHANGE UP (ref 3.8–10.5)
WBC # FLD AUTO: 8 K/UL — SIGNIFICANT CHANGE UP (ref 3.8–10.5)

## 2021-08-26 PROCEDURE — 99213 OFFICE O/P EST LOW 20 MIN: CPT

## 2021-08-26 PROCEDURE — 99024 POSTOP FOLLOW-UP VISIT: CPT

## 2021-08-26 NOTE — HISTORY OF PRESENT ILLNESS
[de-identified] : Ms. Gruber is a 70 year old female newly diagnosed with triple negative left breast cancer at age 70. She recently underwent bilateral screening mammogram 3/18/2021 which demonstrated a new mammographic mass at the 9:00 position of the left breast which was biopsied on 2021 and demonstrated poorly differentiated invasive carcinoma ER- [id number]- [id number] -\par \par 2021 Bilateral screening mammogram \par Left breast nodule\par Left breast sonography is recommended: BI-RADS-0: Incomplete: Needs Additional Imaging evaluation\par \par 2021 DEXA \par Osteopetrosis category with the lowest T score -2/9 within the lumbar spine\par \par 2021 Breast ultrasound Left Complete\par 9:00 5cm from the nipple, corresponding to the new mammographic mass there is a hypoechoic mass with irregular borders measuring 9 X 8 X 2mm cyst.\par Ultrasound- guided core biopsy is recommended\par There is a left axillary lymph node questionable mildly thickened cortex. Patient recently had a COVID vaccine and this may be reactive \par BI-RADS-4: Suspicious Findings 4-C high suspicion for malignancy \par \par 2021 Underwent Breast, left, 9 o'clock 5 cm FN, core biopsy\par - Invasive poorly differentiated ductal carcinoma\par - Farhad score 8/9 (3 + 3 + 2) in this limited material\par - Invasive tumor measures at least 0.7 cm\par - Ductal carcinoma in situ absent\par - Microcalcifications absent\par - Lymphovascular permeation by tumor not seen\par - As per outside pathology report:\par ER:  Negative 0% nuclear staining\par [place]:  Negative 0% nuclear staining\par Her-2:  Negative (0 membrane staining)\par \par 2021 MR Breast w/wo IV Cont, Bilateral w/CAD \par 1.0 cm malignancy in the central left breast.\par No MRI evidence of multicentric or contralateral disease.  Study limited by moderate background enhancement.\par Nonspecific prominent left axillary lymph nodes which may be reactive in the setting of recent Covid vaccination.  Left axillary ultrasound evaluation and sampling of the most suspicious lymph node can be performed if requested.\par RECOMMENDATION:  Surgical or oncologic management. BI-RADS 6- Known Biopsy-Proven Malignancy\par \par 2021 US Axilla Only, Left             \par FINDINGS: No suspicious axillary lymph nodes are noted. A nonspecific benign-appearing 1.9 x 0.6 x 1.2 cm axillary lymph node is identified.\par IMPRESSION:No sonographically suspicious axillary lymph nodes.\par Surgical management of the known left breast cancer remains recommended.\par RECOMMENDATION: Surgical or oncologic management.BI-RADS 2 - Benign Finding(s)\par \par 21 Underwent left breast savita  wide lumpectomy with sentinel node biopsy.\par Surgical Final Report\par Final Diagnosis\par 1     Left sentinel lymph node #2 blue:\par - One lymph node, negative for metastatic carcinoma (0/1).\par 2     Left sentinel lymph node #1:\par - Two lymph node, negative for metastatic carcinoma (0/2).\par 3     Left breast savita wide lumpectomy:\par -        Invasive poorly differentiated mammary ductal carcinoma\par with apocrine features, [place] score 8/9 (3 + 3 + 2),\par adjacent to previous biopsy cavity.\par -       The  invasive tumor measures measuring 1.3 cm, one focus.\par -       Ductal carcinoma in situ, cribriform, low to\par intermediate nuclear grade, without calcifications or necrosis.\par -       All surgical resection margins are negative, the\par tumor is located 0.15 cm from anterior aspect of the resection,\par the closest margin; see also the the following resection\par margins(4-9).\par 4     Left breast deep posterior margin:\par - Breast tissue, negative for dysplasia/neoplasia.\par 5     Left breast anterior margin:\par - Breast tissue, negative for dysplasia/neoplasia.\par 6     Left breast lateral margin:\par - Breast tissue, negative for dysplasia/neoplasia.\par 7     Left breast superior margin:\par - Breast tissue, negative for dysplasia/neoplasia. Minute\par focus of radial scar, completely excised.\par 8     Left breast inferior margin;\par - Breast tissue, negative for dysplasia/neoplasia.\par 9     Left breast medial margin;\par - Breast tissue, negative for dysplasia/neoplasia.\par \par Final Diagnosis\par Left breast, Lumpectomy ( one slide) 1 slide, [id number], HE3E)\par - Invasive poorly differentiated ductal carcinoma with apocrine\par features ( see comment for further details)\par - As per report tumor is triple negative ( ER, [place] and Her-2\par Negative)\par Comment :\par Only one slide is provided for review. Margin of the specimen in\par this one slide is negative for carcinoma. 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 S/p C1 Taxotere/Cytoxan with onpro support and use of Dignicap on 8/6/21. 7/28/21 medi port placed\par \par Noted generalized bone pain starting 8/7/21 with no relief for 3 days with the use of Tylenol PRN and Claritin daily, called office and started Tramadol PRN 8/10/21 with complete relief of bone pain, last used Tramadol 8/12/21 \par Reports mouth irritation starting 8/10/21, called office started First Mouthwash BLM with instant relief, last used 8/13/21\par Reports fatigue for one week post chemo, self resolved, remaining active going for walks, recently went to [facility] \par Good appetite. \par Denies headache, dizziness \par Denies nausea, vomiting, abdominal pain, constipation, diarrhea \par Denies tingling/numbing of extremities \par Denies fever, shortness of breath, cough

## 2021-08-26 NOTE — PHYSICAL EXAM
[Normal] : normoactive bowel sounds, soft and nontender, no hepatosplenomegaly or masses appreciated [de-identified] : suppple [de-identified] : CTA [de-identified] : S1/S2 [de-identified] : No edema [de-identified] : [facility] site well healed

## 2021-08-26 NOTE — CONSULT LETTER
[Dear  ___] : Dear  [unfilled], [Consult Letter:] : I had the pleasure of evaluating your patient, [unfilled]. [Please see my note below.] : Please see my note below. [Consult Closing:] : Thank you very much for allowing me to participate in the care of this patient.  If you have any questions, please do not hesitate to contact me. [Sincerely,] : Sincerely, [DrRosario  ___] : Dr. AYALA [FreeTextEntry3] : Yulia Hairston MD\par Medical Oncology/Hematology\par [facility] [place], [facility]\par \par \par [facility] at [facility]\par

## 2021-08-27 ENCOUNTER — RESULT REVIEW (OUTPATIENT)
Age: 71
End: 2021-08-27

## 2021-08-27 ENCOUNTER — LABORATORY RESULT (OUTPATIENT)
Age: 71
End: 2021-08-27

## 2021-08-27 ENCOUNTER — APPOINTMENT (OUTPATIENT)
Age: 71
End: 2021-08-27

## 2021-08-27 ENCOUNTER — APPOINTMENT (OUTPATIENT)
Dept: HEMATOLOGY ONCOLOGY | Facility: CLINIC | Age: 71
End: 2021-08-27

## 2021-08-27 LAB
ALBUMIN SERPL ELPH-MCNC: 4.2 G/DL
ALP BLD-CCNC: 73 U/L
ALT SERPL-CCNC: 16 U/L
ANION GAP SERPL CALC-SCNC: 10 MMOL/L
AST SERPL-CCNC: 16 U/L
BASOPHILS # BLD AUTO: 0 K/UL — SIGNIFICANT CHANGE UP (ref 0–0.2)
BASOPHILS NFR BLD AUTO: 0.5 % — SIGNIFICANT CHANGE UP (ref 0–2)
BILIRUB SERPL-MCNC: 0.2 MG/DL
BUN SERPL-MCNC: 16 MG/DL
CALCIUM SERPL-MCNC: 9.5 MG/DL
CHLORIDE SERPL-SCNC: 107 MMOL/L
CO2 SERPL-SCNC: 26 MMOL/L
CREAT SERPL-MCNC: 0.86 MG/DL
EOSINOPHIL # BLD AUTO: 0 K/UL — SIGNIFICANT CHANGE UP (ref 0–0.5)
EOSINOPHIL NFR BLD AUTO: 0.4 % — SIGNIFICANT CHANGE UP (ref 0–6)
GLUCOSE SERPL-MCNC: 97 MG/DL
HCT VFR BLD CALC: 37.4 % — SIGNIFICANT CHANGE UP (ref 34.5–45)
HGB BLD-MCNC: 12 G/DL — SIGNIFICANT CHANGE UP (ref 11.5–15.5)
LYMPHOCYTES # BLD AUTO: 0.7 K/UL — LOW (ref 1–3.3)
LYMPHOCYTES # BLD AUTO: 6.6 % — LOW (ref 13–44)
MCHC RBC-ENTMCNC: 32.1 PG — SIGNIFICANT CHANGE UP (ref 27–34)
MCHC RBC-ENTMCNC: 32.2 G/DL — SIGNIFICANT CHANGE UP (ref 32–36)
MCV RBC AUTO: 99.5 FL — SIGNIFICANT CHANGE UP (ref 80–100)
MONOCYTES # BLD AUTO: 0.1 K/UL — SIGNIFICANT CHANGE UP (ref 0–0.9)
MONOCYTES NFR BLD AUTO: 0.6 % — LOW (ref 2–14)
NEUTROPHILS # BLD AUTO: 9 K/UL — HIGH (ref 1.8–7.4)
NEUTROPHILS NFR BLD AUTO: 91.8 % — HIGH (ref 43–77)
PLATELET # BLD AUTO: 619 K/UL — HIGH (ref 150–400)
POTASSIUM SERPL-SCNC: 5.1 MMOL/L
PROT SERPL-MCNC: 6.4 G/DL
RBC # BLD: 3.76 M/UL — LOW (ref 3.8–5.2)
RBC # FLD: 11.8 % — SIGNIFICANT CHANGE UP (ref 10.3–14.5)
SODIUM SERPL-SCNC: 144 MMOL/L
WBC # BLD: 9.8 K/UL — SIGNIFICANT CHANGE UP (ref 3.8–10.5)
WBC # FLD AUTO: 9.8 K/UL — SIGNIFICANT CHANGE UP (ref 3.8–10.5)

## 2021-08-30 DIAGNOSIS — Z51.11 ENCOUNTER FOR ANTINEOPLASTIC CHEMOTHERAPY: ICD-10-CM

## 2021-08-30 DIAGNOSIS — Z51.89 ENCOUNTER FOR OTHER SPECIFIED AFTERCARE: ICD-10-CM

## 2021-08-30 DIAGNOSIS — R11.2 NAUSEA WITH VOMITING, UNSPECIFIED: ICD-10-CM

## 2021-09-01 ENCOUNTER — NON-APPOINTMENT (OUTPATIENT)
Age: 71
End: 2021-09-01

## 2021-09-02 ENCOUNTER — NON-APPOINTMENT (OUTPATIENT)
Age: 71
End: 2021-09-02

## 2021-09-08 ENCOUNTER — APPOINTMENT (OUTPATIENT)
Dept: HEMATOLOGY ONCOLOGY | Facility: CLINIC | Age: 71
End: 2021-09-08
Payer: MEDICARE

## 2021-09-08 ENCOUNTER — RESULT REVIEW (OUTPATIENT)
Age: 71
End: 2021-09-08

## 2021-09-08 VITALS
WEIGHT: 128.04 LBS | HEIGHT: 61 IN | HEART RATE: 95 BPM | SYSTOLIC BLOOD PRESSURE: 103 MMHG | BODY MASS INDEX: 24.17 KG/M2 | DIASTOLIC BLOOD PRESSURE: 71 MMHG | OXYGEN SATURATION: 97 %

## 2021-09-08 DIAGNOSIS — D72.829 ELEVATED WHITE BLOOD CELL COUNT, UNSPECIFIED: ICD-10-CM

## 2021-09-08 LAB
BASOPHILS # BLD AUTO: 0.2 K/UL — SIGNIFICANT CHANGE UP (ref 0–0.2)
BASOPHILS NFR BLD AUTO: 0.7 % — SIGNIFICANT CHANGE UP (ref 0–2)
EOSINOPHIL # BLD AUTO: 0.1 K/UL — SIGNIFICANT CHANGE UP (ref 0–0.5)
EOSINOPHIL NFR BLD AUTO: 0.4 % — SIGNIFICANT CHANGE UP (ref 0–6)
HCT VFR BLD CALC: 35.4 % — SIGNIFICANT CHANGE UP (ref 34.5–45)
HGB BLD-MCNC: 11.4 G/DL — LOW (ref 11.5–15.5)
LYMPHOCYTES # BLD AUTO: 1.6 K/UL — SIGNIFICANT CHANGE UP (ref 1–3.3)
LYMPHOCYTES # BLD AUTO: 7.1 % — LOW (ref 13–44)
MCHC RBC-ENTMCNC: 31.8 PG — SIGNIFICANT CHANGE UP (ref 27–34)
MCHC RBC-ENTMCNC: 32.1 G/DL — SIGNIFICANT CHANGE UP (ref 32–36)
MCV RBC AUTO: 99 FL — SIGNIFICANT CHANGE UP (ref 80–100)
MONOCYTES # BLD AUTO: 0.9 K/UL — SIGNIFICANT CHANGE UP (ref 0–0.9)
MONOCYTES NFR BLD AUTO: 4.2 % — SIGNIFICANT CHANGE UP (ref 2–14)
NEUTROPHILS # BLD AUTO: 19.2 K/UL — HIGH (ref 1.8–7.4)
NEUTROPHILS NFR BLD AUTO: 87.6 % — HIGH (ref 43–77)
PLATELET # BLD AUTO: 218 K/UL — SIGNIFICANT CHANGE UP (ref 150–400)
RBC # BLD: 3.58 M/UL — LOW (ref 3.8–5.2)
RBC # FLD: 12.8 % — SIGNIFICANT CHANGE UP (ref 10.3–14.5)
WBC # BLD: 21.9 K/UL — HIGH (ref 3.8–10.5)
WBC # FLD AUTO: 21.9 K/UL — HIGH (ref 3.8–10.5)

## 2021-09-08 PROCEDURE — 99215 OFFICE O/P EST HI 40 MIN: CPT

## 2021-09-08 NOTE — CONSULT LETTER
[Dear  ___] : Dear  [unfilled], [Consult Letter:] : I had the pleasure of evaluating your patient, [unfilled]. [Please see my note below.] : Please see my note below. [Consult Closing:] : Thank you very much for allowing me to participate in the care of this patient.  If you have any questions, please do not hesitate to contact me. [Sincerely,] : Sincerely, [DrRosario  ___] : Dr. AYALA [FreeTextEntry3] : Yulia Hairston MD\par Medical Oncology/Hematology\par [facility] [place], [facility] [place] [facility]\par \par \par [facility] at [facility]\par

## 2021-09-08 NOTE — ASSESSMENT
[FreeTextEntry1] : 71 y/o postmenopausal female presents newly diagnosed triple negative breast cancer measuring 1.3cm grade 3 with DCIS and clear margins with 0/3 nodes. Underwent left breast savita  wide lumpectomy on 06/28/21. \par 7/28/21 medi port placed\par \par S/p C2 Taxotere/Cytoxan with onpro support on 8/27/21.  Treatment side effects: fatigue, constipation\par CBC w dif reviewed from today WBC 21.9 K HGB 11.4 g HCT 35.4 %  K Neutrophil # 19.2 K \par \par Plan:\par Continue Taxotere and Cytoxan with onpro support every 3 weeks X 4 cycles, C3 9/17/21\par Bone pain due to onpro: Tylenol PRN or Tramadol PRN\par Leukocytosis: secondary to onpro use \par Constipation: start Miralax once daily, not PRN\par Advised can obtain COVID-19 vaccine booster \par Follow up 10/8/21 with NP \par Follow up 11/8/21 with Dr. Hairston \par Needs adjuvant RT and will be getting at [facility] location by Dr. Conti, follow up is later this month

## 2021-09-08 NOTE — CONSULT LETTER
[Dear  ___] : Dear  [unfilled], [Consult Letter:] : I had the pleasure of evaluating your patient, [unfilled]. [Please see my note below.] : Please see my note below. [Consult Closing:] : Thank you very much for allowing me to participate in the care of this patient.  If you have any questions, please do not hesitate to contact me. [Sincerely,] : Sincerely, [DrRosario  ___] : Dr. AYALA [FreeTextEntry3] : Yulia Hairston MD\par Medical Oncology/Hematology\par [facility] [place], [facility]\par \par \par [facility] at [facility]\par

## 2021-09-08 NOTE — PHYSICAL EXAM
[de-identified] : [facility] site well healed  [Normal] : affect appropriate [de-identified] : suppple [de-identified] : CTA [de-identified] : S1/S2 [de-identified] : No edema

## 2021-09-08 NOTE — HISTORY OF PRESENT ILLNESS
[de-identified] : Ms. Gruber is a 70 year old female newly diagnosed with triple negative left breast cancer at age 70. She recently underwent bilateral screening mammogram 3/18/2021 which demonstrated a new mammographic mass at the 9:00 position of the left breast which was biopsied on 2021 and demonstrated poorly differentiated invasive carcinoma ER- [id number]- [id number] -\par \par 2021 Bilateral screening mammogram \par Left breast nodule\par Left breast sonography is recommended: BI-RADS-0: Incomplete: Needs Additional Imaging evaluation\par \par 2021 DEXA \par Osteopetrosis category with the lowest T score -2/9 within the lumbar spine\par \par 2021 Breast ultrasound Left Complete\par 9:00 5cm from the nipple, corresponding to the new mammographic mass there is a hypoechoic mass with irregular borders measuring 9 X 8 X 2mm cyst.\par Ultrasound- guided core biopsy is recommended\par There is a left axillary lymph node questionable mildly thickened cortex. Patient recently had a COVID vaccine and this may be reactive \par BI-RADS-4: Suspicious Findings 4-C high suspicion for malignancy \par \par 2021 Underwent Breast, left, 9 o'clock 5 cm FN, core biopsy\par - Invasive poorly differentiated ductal carcinoma\par - Farhad score 8/9 (3 + 3 + 2) in this limited material\par - Invasive tumor measures at least 0.7 cm\par - Ductal carcinoma in situ absent\par - Microcalcifications absent\par - Lymphovascular permeation by tumor not seen\par - As per outside pathology report:\par ER:  Negative 0% nuclear staining\par [place]:  Negative 0% nuclear staining\par Her-2:  Negative (0 membrane staining)\par \par 2021 MR Breast w/wo IV Cont, Bilateral w/CAD \par 1.0 cm malignancy in the central left breast.\par No MRI evidence of multicentric or contralateral disease.  Study limited by moderate background enhancement.\par Nonspecific prominent left axillary lymph nodes which may be reactive in the setting of recent Covid vaccination.  Left axillary ultrasound evaluation and sampling of the most suspicious lymph node can be performed if requested.\par RECOMMENDATION:  Surgical or oncologic management. BI-RADS 6- Known Biopsy-Proven Malignancy\par \par 2021 US Axilla Only, Left             \par FINDINGS: No suspicious axillary lymph nodes are noted. A nonspecific benign-appearing 1.9 x 0.6 x 1.2 cm axillary lymph node is identified.\par IMPRESSION:No sonographically suspicious axillary lymph nodes.\par Surgical management of the known left breast cancer remains recommended.\par RECOMMENDATION: Surgical or oncologic management.BI-RADS 2 - Benign Finding(s)\par \par 21 Underwent left breast savita  wide lumpectomy with sentinel node biopsy.\par Surgical Final Report\par Final Diagnosis\par 1     Left sentinel lymph node #2 blue:\par - One lymph node, negative for metastatic carcinoma (0/1).\par 2     Left sentinel lymph node #1:\par - Two lymph node, negative for metastatic carcinoma (0/2).\par 3     Left breast savita wide lumpectomy:\par -        Invasive poorly differentiated mammary ductal carcinoma\par with apocrine features, [place] score 8/9 (3 + 3 + 2),\par adjacent to previous biopsy cavity.\par -       The  invasive tumor measures measuring 1.3 cm, one focus.\par -       Ductal carcinoma in situ, cribriform, low to\par intermediate nuclear grade, without calcifications or necrosis.\par -       All surgical resection margins are negative, the\par tumor is located 0.15 cm from anterior aspect of the resection,\par the closest margin; see also the the following resection\par margins(4-9).\par 4     Left breast deep posterior margin:\par - Breast tissue, negative for dysplasia/neoplasia.\par 5     Left breast anterior margin:\par - Breast tissue, negative for dysplasia/neoplasia.\par 6     Left breast lateral margin:\par - Breast tissue, negative for dysplasia/neoplasia.\par 7     Left breast superior margin:\par - Breast tissue, negative for dysplasia/neoplasia. Minute\par focus of radial scar, completely excised.\par 8     Left breast inferior margin;\par - Breast tissue, negative for dysplasia/neoplasia.\par 9     Left breast medial margin;\par - Breast tissue, negative for dysplasia/neoplasia.\par \par Final Diagnosis\par Left breast, Lumpectomy ( one slide) 1 slide, [id number], HE3E)\par - Invasive poorly differentiated ductal carcinoma with apocrine\par features ( see comment for further details)\par - As per report tumor is triple negative ( ER, [place] and Her-2\par Negative)\par Comment :\par Only one slide is provided for review. Margin of the specimen in\par this one slide is negative for carcinoma. 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 S/p C2 Taxotere/Cytoxan with onpro support and use of Dignicap on 8/27/21\par Reports fatigue, recent death in family \par Notes constipation for 5 days after chemo, resolved with the use of Dulcolax and MiraLAX \par Change in appetite due to dysgeusia, fair appetite. Using Pedialyte \par Mild mucositis \par Reports hot flashes. No fever \par Notes bone pain was significantly less post chemo \par Has dental work pending. \par Following up with [facility] radiation oncologist Dr. Martin Conti this month \par Questioning COVID-19 booster vaccine, completed 2nd dose of Moderna COVID-19 vaccine in 3/2021 \par

## 2021-09-08 NOTE — HISTORY OF PRESENT ILLNESS
[de-identified] : Ms. Gruber is a 70 year old female newly diagnosed with triple negative left breast cancer at age 70. She recently underwent bilateral screening mammogram 3/18/2021 which demonstrated a new mammographic mass at the 9:00 position of the left breast which was biopsied on 2021 and demonstrated poorly differentiated invasive carcinoma ER- AZ- [id number] -\par \par 2021 Bilateral screening mammogram \par Left breast nodule\par Left breast sonography is recommended: BI-RADS-0: Incomplete: Needs Additional Imaging evaluation\par \par 2021 DEXA \par Osteopetrosis category with the lowest T score -2/9 within the lumbar spine\par \par 2021 Breast ultrasound Left Complete\par 9:00 5cm from the nipple, corresponding to the new mammographic mass there is a hypoechoic mass with irregular borders measuring 9 X 8 X 2mm cyst.\par Ultrasound- guided core biopsy is recommended\par There is a left axillary lymph node questionable mildly thickened cortex. Patient recently had a COVID vaccine and this may be reactive \par BI-RADS-4: Suspicious Findings 4-C high suspicion for malignancy \par \par 2021 Underwent Breast, left, 9 o'clock 5 cm FN, core biopsy\par - Invasive poorly differentiated ductal carcinoma\par - Farhad score 8/9 (3 + 3 + 2) in this limited material\par - Invasive tumor measures at least 0.7 cm\par - Ductal carcinoma in situ absent\par - Microcalcifications absent\par - Lymphovascular permeation by tumor not seen\par - As per outside pathology report:\par ER:  Negative 0% nuclear staining\par AZ:  Negative 0% nuclear staining\par Her-2:  Negative (0 membrane staining)\par \par 2021 MR Breast w/wo IV Cont, Bilateral w/CAD \par 1.0 cm malignancy in the central left breast.\par No MRI evidence of multicentric or contralateral disease.  Study limited by moderate background enhancement.\par Nonspecific prominent left axillary lymph nodes which may be reactive in the setting of recent Covid vaccination.  Left axillary ultrasound evaluation and sampling of the most suspicious lymph node can be performed if requested.\par RECOMMENDATION:  Surgical or oncologic management. BI-RADS 6- Known Biopsy-Proven Malignancy\par \par 2021 US Axilla Only, Left             \par FINDINGS: No suspicious axillary lymph nodes are noted. A nonspecific benign-appearing 1.9 x 0.6 x 1.2 cm axillary lymph node is identified.\par IMPRESSION:No sonographically suspicious axillary lymph nodes.\par Surgical management of the known left breast cancer remains recommended.\par RECOMMENDATION: Surgical or oncologic management.BI-RADS 2 - Benign Finding(s)\par \par 21 Underwent left breast savita  wide lumpectomy with sentinel node biopsy.\par Surgical Final Report\par Final Diagnosis\par 1     Left sentinel lymph node #2 blue:\par - One lymph node, negative for metastatic carcinoma (0/1).\par 2     Left sentinel lymph node #1:\par - Two lymph node, negative for metastatic carcinoma (0/2).\par 3     Left breast savita wide lumpectomy:\par -        Invasive poorly differentiated mammary ductal carcinoma\par with apocrine features, [place] score 8/9 (3 + 3 + 2),\par adjacent to previous biopsy cavity.\par -       The  invasive tumor measures measuring 1.3 cm, one focus.\par -       Ductal carcinoma in situ, cribriform, low to\par intermediate nuclear grade, without calcifications or necrosis.\par -       All surgical resection margins are negative, the\par tumor is located 0.15 cm from anterior aspect of the resection,\par the closest margin; see also the the following resection\par margins(4-9).\par 4     Left breast deep posterior margin:\par - Breast tissue, negative for dysplasia/neoplasia.\par 5     Left breast anterior margin:\par - Breast tissue, negative for dysplasia/neoplasia.\par 6     Left breast lateral margin:\par - Breast tissue, negative for dysplasia/neoplasia.\par 7     Left breast superior margin:\par - Breast tissue, negative for dysplasia/neoplasia. Minute\par focus of radial scar, completely excised.\par 8     Left breast inferior margin;\par - Breast tissue, negative for dysplasia/neoplasia.\par 9     Left breast medial margin;\par - Breast tissue, negative for dysplasia/neoplasia.\par \par Final Diagnosis\par Left breast, Lumpectomy ( one slide) 1 slide, [id number], HE3E)\par - Invasive poorly differentiated ductal carcinoma with apocrine\par features ( see comment for further details)\par - As per report tumor is triple negative ( ER, AZ and Her-2\par Negative)\par Comment :\par Only one slide is provided for review. Margin of the specimen in\par this one slide is negative for carcinoma. 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 S/p C2 Taxotere/Cytoxan with onpro support and use of Dignicap on 8/27/21\par Reports fatigue, recent death in family \par Notes constipation for 5 days after chemo, resolved with the use of Dulcolax and MiraLAX \par Change in appetite due to dysgeusia, fair appetite. Using Pedialyte \par Mild mucositis \par Reports hot flashes. No fever \par Notes bone pain was significantly less post chemo \par Has dental work pending. \par Following up with [facility] radiation oncologist Dr. Martin Conti this month \par Questioning COVID-19 booster vaccine, completed 2nd dose of Moderna COVID-19 vaccine in 3/2021 \par

## 2021-09-08 NOTE — ASSESSMENT
[FreeTextEntry1] : 71 y/o postmenopausal female presents newly diagnosed triple negative breast cancer measuring 1.3cm grade 3 with DCIS and clear margins with 0/3 nodes. Underwent left breast savita  wide lumpectomy on 06/28/21. \par 7/28/21 medi port placed\par \par S/p C2 Taxotere/Cytoxan with onpro support on 8/27/21.  Treatment side effects: fatigue, constipation\par CBC w dif reviewed from today WBC 21.9 K HGB 11.4 g HCT 35.4 %  K Neutrophil # 19.2 K \par \par Plan:\par Continue Taxotere and Cytoxan with onpro support every 3 weeks X 4 cycles, C3 9/17/21\par Bone pain due to onpro: Tylenol PRN or Tramadol PRN\par Leukocytosis: secondary to onpro use \par Constipation: start Miralax once daily, not PRN\par Advised can obtain COVID-19 vaccine booster \par Follow up 10/8/21 with NP \par Follow up 11/8/21 with Dr. Hairston \par Needs adjuvant RT and will be getting at [facility] location by Dr. Conti, follow up is later this month

## 2021-09-08 NOTE — PHYSICAL EXAM
[de-identified] : [facility] site well healed  [Normal] : affect appropriate [de-identified] : suppple [de-identified] : CTA [de-identified] : S1/S2 [de-identified] : No edema

## 2021-09-17 ENCOUNTER — APPOINTMENT (OUTPATIENT)
Dept: HEMATOLOGY ONCOLOGY | Facility: CLINIC | Age: 71
End: 2021-09-17

## 2021-09-17 ENCOUNTER — RESULT REVIEW (OUTPATIENT)
Age: 71
End: 2021-09-17

## 2021-09-17 ENCOUNTER — LABORATORY RESULT (OUTPATIENT)
Age: 71
End: 2021-09-17

## 2021-09-17 ENCOUNTER — APPOINTMENT (OUTPATIENT)
Age: 71
End: 2021-09-17

## 2021-09-17 LAB
BASOPHILS # BLD AUTO: 0.1 K/UL — SIGNIFICANT CHANGE UP (ref 0–0.2)
BASOPHILS NFR BLD AUTO: 0.6 % — SIGNIFICANT CHANGE UP (ref 0–2)
EOSINOPHIL # BLD AUTO: 0 K/UL — SIGNIFICANT CHANGE UP (ref 0–0.5)
EOSINOPHIL NFR BLD AUTO: 0.3 % — SIGNIFICANT CHANGE UP (ref 0–6)
HCT VFR BLD CALC: 29.7 % — LOW (ref 34.5–45)
HGB BLD-MCNC: 10.4 G/DL — LOW (ref 11.5–15.5)
LYMPHOCYTES # BLD AUTO: 0.6 K/UL — LOW (ref 1–3.3)
LYMPHOCYTES # BLD AUTO: 4.2 % — LOW (ref 13–44)
MCHC RBC-ENTMCNC: 34.1 PG — HIGH (ref 27–34)
MCHC RBC-ENTMCNC: 35 G/DL — SIGNIFICANT CHANGE UP (ref 32–36)
MCV RBC AUTO: 97.4 FL — SIGNIFICANT CHANGE UP (ref 80–100)
MONOCYTES # BLD AUTO: 0.5 K/UL — SIGNIFICANT CHANGE UP (ref 0–0.9)
MONOCYTES NFR BLD AUTO: 3.2 % — SIGNIFICANT CHANGE UP (ref 2–14)
NEUTROPHILS # BLD AUTO: 13.4 K/UL — HIGH (ref 1.8–7.4)
NEUTROPHILS NFR BLD AUTO: 91.8 % — HIGH (ref 43–77)
PLATELET # BLD AUTO: 622 K/UL — HIGH (ref 150–400)
RBC # BLD: 3.05 M/UL — LOW (ref 3.8–5.2)
RBC # FLD: 13 % — SIGNIFICANT CHANGE UP (ref 10.3–14.5)
WBC # BLD: 14.6 K/UL — HIGH (ref 3.8–10.5)
WBC # FLD AUTO: 14.6 K/UL — HIGH (ref 3.8–10.5)

## 2021-09-22 ENCOUNTER — NON-APPOINTMENT (OUTPATIENT)
Age: 71
End: 2021-09-22

## 2021-09-24 LAB
ALBUMIN SERPL ELPH-MCNC: 3.8 G/DL
ALP BLD-CCNC: 195 U/L
ALT SERPL-CCNC: 18 U/L
ANION GAP SERPL CALC-SCNC: 10 MMOL/L
AST SERPL-CCNC: 11 U/L
BILIRUB SERPL-MCNC: <0.2 MG/DL
BUN SERPL-MCNC: 14 MG/DL
CALCIUM SERPL-MCNC: 8.8 MG/DL
CHLORIDE SERPL-SCNC: 108 MMOL/L
CO2 SERPL-SCNC: 24 MMOL/L
CREAT SERPL-MCNC: 0.93 MG/DL
GLUCOSE SERPL-MCNC: 112 MG/DL
POTASSIUM SERPL-SCNC: 4.1 MMOL/L
PROT SERPL-MCNC: 5.8 G/DL
SODIUM SERPL-SCNC: 142 MMOL/L

## 2021-10-04 NOTE — HISTORY OF PRESENT ILLNESS
[FreeTextEntry1] : re: 8 week follow up for left breast cancer s/p breast conservation surgery\par \par I had the pleasure of seeing Kristie Gruber in the office to follow up from recent surgery.  Kristie was diagnosed and treated for poorly differentiated triple negative breast cancer. She underwent left breast conservation with localized lumpectomy and SLNB 6/21/2021. \par \par She has healed well from surgery. \par Pathology:\par Final pathology demonstrated 1.3cm IDC grade with DCIS 3 triple negative with clear margins (after assessing additional margins) and 0/3 sentinel nodes. \par  Left sentinel node 1. negative for carcinoma\par Left sentinel node 2 (0/2) negative for carcinoma\par Left breast savita wide lumpectomy: Invasive poorly differentiated carcinoma with DCIS. The invasive tumor measures 1.3cm one focus. All surgical resection margins are negative. the tumor is located 0.15 cm from anterior aspect of the resection, the closest margin; ER negative NC negative [id number] negative\par Anterior margin: negative\par Posterior deep margin: negative\par Lateral margin: negative\par Medial margin: negative\par Superior margin: negative\par Inferior margin: negative\par \par She is currently on chemotherapy expected to complete regimen 8/27/2021. \par \par We discussed the need for adjuvant radiation and she understands and agrees to the plan.\par All questions were answered.

## 2021-10-04 NOTE — ASSESSMENT
[FreeTextEntry1] : 69 yo postmenopausal female with Stage 1 triple negative breast cancer, underwent breast conservation surgery with lumpectomy and SLNB on 6/21/2021 0/3 nodes is currently on chemotherapy about to complete regimen. She is doing well and understands adjuvant radiation is recommended. Genetic testing was negative.\par 1. follow up with radiation oncology\par 2. Clinical examination in 6 months\par 3. Bilateral diagnostic mammogram/ultrasound 6 months after radiation which would be after she is seen for clinical examination\par

## 2021-10-05 ENCOUNTER — OUTPATIENT (OUTPATIENT)
Dept: OUTPATIENT SERVICES | Facility: HOSPITAL | Age: 71
LOS: 1 days | Discharge: HOME | End: 2021-10-05

## 2021-10-05 DIAGNOSIS — Z98.890 OTHER SPECIFIED POSTPROCEDURAL STATES: Chronic | ICD-10-CM

## 2021-10-05 DIAGNOSIS — D50.9 IRON DEFICIENCY ANEMIA, UNSPECIFIED: ICD-10-CM

## 2021-10-08 ENCOUNTER — APPOINTMENT (OUTPATIENT)
Dept: HEMATOLOGY ONCOLOGY | Facility: CLINIC | Age: 71
End: 2021-10-08
Payer: MEDICARE

## 2021-10-08 ENCOUNTER — RESULT REVIEW (OUTPATIENT)
Age: 71
End: 2021-10-08

## 2021-10-08 ENCOUNTER — LABORATORY RESULT (OUTPATIENT)
Age: 71
End: 2021-10-08

## 2021-10-08 ENCOUNTER — APPOINTMENT (OUTPATIENT)
Age: 71
End: 2021-10-08

## 2021-10-08 VITALS
SYSTOLIC BLOOD PRESSURE: 138 MMHG | BODY MASS INDEX: 24.55 KG/M2 | HEART RATE: 96 BPM | DIASTOLIC BLOOD PRESSURE: 79 MMHG | WEIGHT: 130 LBS | HEIGHT: 61 IN | OXYGEN SATURATION: 98 %

## 2021-10-08 DIAGNOSIS — Z79.899 OTHER LONG TERM (CURRENT) DRUG THERAPY: ICD-10-CM

## 2021-10-08 DIAGNOSIS — K59.00 CONSTIPATION, UNSPECIFIED: ICD-10-CM

## 2021-10-08 LAB
BASOPHILS # BLD AUTO: 0 K/UL — SIGNIFICANT CHANGE UP (ref 0–0.2)
BASOPHILS NFR BLD AUTO: 0.3 % — SIGNIFICANT CHANGE UP (ref 0–2)
EOSINOPHIL # BLD AUTO: 0 K/UL — SIGNIFICANT CHANGE UP (ref 0–0.5)
EOSINOPHIL NFR BLD AUTO: 0.2 % — SIGNIFICANT CHANGE UP (ref 0–6)
HCT VFR BLD CALC: 31.4 % — LOW (ref 34.5–45)
HGB BLD-MCNC: 10.4 G/DL — LOW (ref 11.5–15.5)
LYMPHOCYTES # BLD AUTO: 0.6 K/UL — LOW (ref 1–3.3)
LYMPHOCYTES # BLD AUTO: 6.1 % — LOW (ref 13–44)
MCHC RBC-ENTMCNC: 32.7 PG — SIGNIFICANT CHANGE UP (ref 27–34)
MCHC RBC-ENTMCNC: 33.3 G/DL — SIGNIFICANT CHANGE UP (ref 32–36)
MCV RBC AUTO: 98.3 FL — SIGNIFICANT CHANGE UP (ref 80–100)
MONOCYTES # BLD AUTO: 0.3 K/UL — SIGNIFICANT CHANGE UP (ref 0–0.9)
MONOCYTES NFR BLD AUTO: 3.5 % — SIGNIFICANT CHANGE UP (ref 2–14)
NEUTROPHILS # BLD AUTO: 8.4 K/UL — HIGH (ref 1.8–7.4)
NEUTROPHILS NFR BLD AUTO: 89.9 % — HIGH (ref 43–77)
PLATELET # BLD AUTO: 422 K/UL — HIGH (ref 150–400)
RBC # BLD: 3.19 M/UL — LOW (ref 3.8–5.2)
RBC # FLD: 14.7 % — HIGH (ref 10.3–14.5)
WBC # BLD: 9.3 K/UL — SIGNIFICANT CHANGE UP (ref 3.8–10.5)
WBC # FLD AUTO: 9.3 K/UL — SIGNIFICANT CHANGE UP (ref 3.8–10.5)

## 2021-10-08 PROCEDURE — 99214 OFFICE O/P EST MOD 30 MIN: CPT

## 2021-10-08 RX ORDER — OMEPRAZOLE 10 MG/1
0 CAPSULE, DELAYED RELEASE ORAL
Qty: 0 | Refills: 0 | DISCHARGE

## 2021-10-08 RX ORDER — SIMVASTATIN 20 MG/1
1 TABLET, FILM COATED ORAL
Qty: 0 | Refills: 0 | DISCHARGE

## 2021-10-08 RX ORDER — FLUTICASONE PROPIONATE 50 MCG
0 SPRAY, SUSPENSION NASAL
Qty: 0 | Refills: 4 | DISCHARGE

## 2021-10-08 RX ORDER — ZOLPIDEM TARTRATE 10 MG/1
0 TABLET ORAL
Qty: 0 | Refills: 0 | DISCHARGE

## 2021-10-08 RX ORDER — SOD,AMMONIUM,POTASSIUM LACTATE
0 CREAM (GRAM) TOPICAL
Qty: 0 | Refills: 0 | DISCHARGE

## 2021-10-08 NOTE — PHYSICAL EXAM
[Normal] : affect appropriate [de-identified] : No alopecia of scalp [de-identified] : suppple [de-identified] : CTA [de-identified] : S1/S2 [de-identified] : No edema

## 2021-10-08 NOTE — CONSULT LETTER
[Dear  ___] : Dear  [unfilled], [Consult Letter:] : I had the pleasure of evaluating your patient, [unfilled]. [Please see my note below.] : Please see my note below. [Consult Closing:] : Thank you very much for allowing me to participate in the care of this patient.  If you have any questions, please do not hesitate to contact me. [Sincerely,] : Sincerely, [FreeTextEntry3] : Yulia Hairston MD\par Medical Oncology/Hematology\par [facility] [place]\par \par \par [facility]\par   [DroRsario  ___] : Dr. AYALA

## 2021-10-08 NOTE — ASSESSMENT
[FreeTextEntry1] : 69 y/o postmenopausal female presents newly diagnosed triple negative breast cancer measuring 1.3cm grade 3 with DCIS and clear margins with 0/3 nodes. Underwent left breast savita  wide lumpectomy on 06/28/21. \par 7/28/21 medi port placed\par \par S/p C3 Taxotere/Cytoxan with onpro support on 9/17/21 Treatment side effects: constipation, now resolved \par CBC w dif reviewed from today WBC 9.3 K HGB 10.4 g HCT 31.4 %  K Neutrophil # 8.4 K \par \par Plan:\par Continue Taxotere and Cytoxan with onpro support every 3 weeks X 4 cycles, C4 today\par Bone pain due to onpro: Tylenol PRN or Tramadol PRN\par Leukocytosis: resolved\par Intermittent constipation: start Miralax once daily, not PRN\par Advised can obtain COVID-19 vaccine booster \par Needs adjuvant RT and will be getting at [facility] location by Dr. Conti, follow up 10/21/21\par Advised to call with concerns/symptoms \par Follow up 11/9/21 with Dr. Hairston

## 2021-10-08 NOTE — HISTORY OF PRESENT ILLNESS
[de-identified] : Ms. Gruber is a 70 year old female newly diagnosed with triple negative left breast cancer at age 70. She recently underwent bilateral screening mammogram 3/18/2021 which demonstrated a new mammographic mass at the 9:00 position of the left breast which was biopsied on 2021 and demonstrated poorly differentiated invasive carcinoma ER- IL- [id number] -\par \par 2021 Bilateral screening mammogram \par Left breast nodule\par Left breast sonography is recommended: BI-RADS-0: Incomplete: Needs Additional Imaging evaluation\par \par 2021 DEXA \par Osteopetrosis category with the lowest T score -2/9 within the lumbar spine\par \par 2021 Breast ultrasound Left Complete\par 9:00 5cm from the nipple, corresponding to the new mammographic mass there is a hypoechoic mass with irregular borders measuring 9 X 8 X 2mm cyst.\par Ultrasound- guided core biopsy is recommended\par There is a left axillary lymph node questionable mildly thickened cortex. Patient recently had a COVID vaccine and this may be reactive \par BI-RADS-4: Suspicious Findings 4-C high suspicion for malignancy \par \par 2021 Underwent Breast, left, 9 o'clock 5 cm FN, core biopsy\par - Invasive poorly differentiated ductal carcinoma\par - Farhad score 8/9 (3 + 3 + 2) in this limited material\par - Invasive tumor measures at least 0.7 cm\par - Ductal carcinoma in situ absent\par - Microcalcifications absent\par - Lymphovascular permeation by tumor not seen\par - As per outside pathology report:\par ER:  Negative 0% nuclear staining\par IL:  Negative 0% nuclear staining\par Her-2:  Negative (0 membrane staining)\par \par 2021 MR Breast w/wo IV Cont, Bilateral w/CAD \par 1.0 cm malignancy in the central left breast.\par No MRI evidence of multicentric or contralateral disease.  Study limited by moderate background enhancement.\par Nonspecific prominent left axillary lymph nodes which may be reactive in the setting of recent Covid vaccination.  Left axillary ultrasound evaluation and sampling of the most suspicious lymph node can be performed if requested.\par RECOMMENDATION:  Surgical or oncologic management. BI-RADS 6- Known Biopsy-Proven Malignancy\par \par 2021 US Axilla Only, Left             \par FINDINGS: No suspicious axillary lymph nodes are noted. A nonspecific benign-appearing 1.9 x 0.6 x 1.2 cm axillary lymph node is identified.\par IMPRESSION:No sonographically suspicious axillary lymph nodes.\par Surgical management of the known left breast cancer remains recommended.\par RECOMMENDATION: Surgical or oncologic management.BI-RADS 2 - Benign Finding(s)\par \par 21 Underwent left breast savita  wide lumpectomy with sentinel node biopsy.\par Surgical Final Report\par Final Diagnosis\par 1     Left sentinel lymph node #2 blue:\par - One lymph node, negative for metastatic carcinoma (0/1).\par 2     Left sentinel lymph node #1:\par - Two lymph node, negative for metastatic carcinoma (0/2).\par 3     Left breast savita wide lumpectomy:\par -        Invasive poorly differentiated mammary ductal carcinoma\par with apocrine features, [place] score 8/9 (3 + 3 + 2),\par adjacent to previous biopsy cavity.\par -       The  invasive tumor measures measuring 1.3 cm, one focus.\par -       Ductal carcinoma in situ, cribriform, low to\par intermediate nuclear grade, without calcifications or necrosis.\par -       All surgical resection margins are negative, the\par tumor is located 0.15 cm from anterior aspect of the resection,\par the closest margin; see also the the following resection\par margins(4-9).\par 4     Left breast deep posterior margin:\par - Breast tissue, negative for dysplasia/neoplasia.\par 5     Left breast anterior margin:\par - Breast tissue, negative for dysplasia/neoplasia.\par 6     Left breast lateral margin:\par - Breast tissue, negative for dysplasia/neoplasia.\par 7     Left breast superior margin:\par - Breast tissue, negative for dysplasia/neoplasia. Minute\par focus of radial scar, completely excised.\par 8     Left breast inferior margin;\par - Breast tissue, negative for dysplasia/neoplasia.\par 9     Left breast medial margin;\par - Breast tissue, negative for dysplasia/neoplasia.\par \par Final Diagnosis\par Left breast, Lumpectomy ( one slide) 1 slide, [id number], HE3E)\par - Invasive poorly differentiated ductal carcinoma with apocrine\par features ( see comment for further details)\par - As per report tumor is triple negative ( ER, IL and Her-2\par Negative)\par Comment :\par Only one slide is provided for review. Margin of the specimen in\par this one slide is negative for carcinoma. 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 S/p C3 Taxotere/Cytoxan with onpro support and use of Dignicap on 9/17/21 \par \par Reports intermittent fatigue, remaining active\par Denies mucositis \par Denies pain, headache, dizziness\par Denies fever, shortness of breath\par Denies abdominal pain, nausea, vomiting, diarrhea, constipation. \par Notes constipation for 5 days after chemo used magnesium citrate on 9/22/21 with complete resolution; did not take Miralax daily as advised on 9/8/21. Has been having normal daily bowel movements since 9/23/21\par Resolved dysgeusia. Good appetite.\par Denies leg pain, leg swelling \par Following up with [facility] radiation oncologist Dr. Martin Conti 10/21/21 for simulation and plan to start radiation therapy immediately after simulation \par Inquiring about COVID-19 booster vaccine, completed 2nd dose of Moderna COVID-19 vaccine in 3/2021

## 2021-10-11 DIAGNOSIS — C50.919 MALIGNANT NEOPLASM OF UNSPECIFIED SITE OF UNSPECIFIED FEMALE BREAST: ICD-10-CM

## 2021-10-11 DIAGNOSIS — R11.2 NAUSEA WITH VOMITING, UNSPECIFIED: ICD-10-CM

## 2021-10-11 DIAGNOSIS — Z51.89 ENCOUNTER FOR OTHER SPECIFIED AFTERCARE: ICD-10-CM

## 2021-10-11 DIAGNOSIS — Z51.11 ENCOUNTER FOR ANTINEOPLASTIC CHEMOTHERAPY: ICD-10-CM

## 2021-11-06 ENCOUNTER — OUTPATIENT (OUTPATIENT)
Dept: OUTPATIENT SERVICES | Facility: HOSPITAL | Age: 71
LOS: 1 days | Discharge: HOME | End: 2021-11-06

## 2021-11-06 DIAGNOSIS — Z98.890 OTHER SPECIFIED POSTPROCEDURAL STATES: Chronic | ICD-10-CM

## 2021-11-06 DIAGNOSIS — D50.9 IRON DEFICIENCY ANEMIA, UNSPECIFIED: ICD-10-CM

## 2021-11-06 DIAGNOSIS — C50.919 MALIGNANT NEOPLASM OF UNSPECIFIED SITE OF UNSPECIFIED FEMALE BREAST: ICD-10-CM

## 2021-11-09 ENCOUNTER — APPOINTMENT (OUTPATIENT)
Dept: HEMATOLOGY ONCOLOGY | Facility: CLINIC | Age: 71
End: 2021-11-09
Payer: MEDICARE

## 2021-11-09 ENCOUNTER — RESULT REVIEW (OUTPATIENT)
Age: 71
End: 2021-11-09

## 2021-11-09 VITALS
OXYGEN SATURATION: 96 % | BODY MASS INDEX: 24.73 KG/M2 | DIASTOLIC BLOOD PRESSURE: 73 MMHG | HEIGHT: 61 IN | WEIGHT: 131 LBS | SYSTOLIC BLOOD PRESSURE: 116 MMHG | HEART RATE: 116 BPM

## 2021-11-09 DIAGNOSIS — D64.81 ANEMIA DUE TO ANTINEOPLASTIC CHEMOTHERAPY: ICD-10-CM

## 2021-11-09 DIAGNOSIS — T45.1X5A ANEMIA DUE TO ANTINEOPLASTIC CHEMOTHERAPY: ICD-10-CM

## 2021-11-09 LAB
ANISOCYTOSIS BLD QL: SLIGHT — SIGNIFICANT CHANGE UP
BASOPHILS # BLD AUTO: 0 K/UL — SIGNIFICANT CHANGE UP (ref 0–0.2)
BASOPHILS NFR BLD AUTO: 1 % — SIGNIFICANT CHANGE UP (ref 0–2)
ELLIPTOCYTES BLD QL SMEAR: SLIGHT — SIGNIFICANT CHANGE UP
EOSINOPHIL # BLD AUTO: 1 K/UL — HIGH (ref 0–0.5)
EOSINOPHIL NFR BLD AUTO: 17 % — HIGH (ref 0–6)
HCT VFR BLD CALC: 34.4 % — LOW (ref 34.5–45)
HGB BLD-MCNC: 10.3 G/DL — LOW (ref 11.5–15.5)
HYPOCHROMIA BLD QL: SLIGHT — SIGNIFICANT CHANGE UP
LYMPHOCYTES # BLD AUTO: 1 K/UL — SIGNIFICANT CHANGE UP (ref 1–3.3)
LYMPHOCYTES # BLD AUTO: 11 % — LOW (ref 13–44)
MACROCYTES BLD QL: SLIGHT — SIGNIFICANT CHANGE UP
MCHC RBC-ENTMCNC: 30.1 G/DL — LOW (ref 32–36)
MCHC RBC-ENTMCNC: 31.4 PG — SIGNIFICANT CHANGE UP (ref 27–34)
MCV RBC AUTO: 104.3 FL — HIGH (ref 80–100)
MICROCYTES BLD QL: SLIGHT — SIGNIFICANT CHANGE UP
MONOCYTES # BLD AUTO: 0.9 K/UL — SIGNIFICANT CHANGE UP (ref 0–0.9)
MONOCYTES NFR BLD AUTO: 12 % — SIGNIFICANT CHANGE UP (ref 2–14)
NEUTROPHILS # BLD AUTO: 3 K/UL — SIGNIFICANT CHANGE UP (ref 1.8–7.4)
NEUTROPHILS NFR BLD AUTO: 58 % — SIGNIFICANT CHANGE UP (ref 43–77)
OVALOCYTES BLD QL SMEAR: SLIGHT — SIGNIFICANT CHANGE UP
PLAT MORPH BLD: NORMAL — SIGNIFICANT CHANGE UP
PLATELET # BLD AUTO: 341 K/UL — SIGNIFICANT CHANGE UP (ref 150–400)
POIKILOCYTOSIS BLD QL AUTO: SLIGHT — SIGNIFICANT CHANGE UP
RBC # BLD: 3.3 M/UL — LOW (ref 3.8–5.2)
RBC # FLD: 14.5 % — SIGNIFICANT CHANGE UP (ref 10.3–14.5)
RBC BLD AUTO: SIGNIFICANT CHANGE UP
VARIANT LYMPHS # BLD: 1 % — SIGNIFICANT CHANGE UP (ref 0–6)
WBC # BLD: 5.8 K/UL — SIGNIFICANT CHANGE UP (ref 3.8–10.5)
WBC # FLD AUTO: 5.8 K/UL — SIGNIFICANT CHANGE UP (ref 3.8–10.5)

## 2021-11-09 PROCEDURE — 99214 OFFICE O/P EST MOD 30 MIN: CPT

## 2021-11-09 RX ORDER — CEPHALEXIN 500 MG/1
500 CAPSULE ORAL
Qty: 14 | Refills: 0 | Status: DISCONTINUED | COMMUNITY
Start: 2021-11-02

## 2021-11-09 RX ORDER — FLUTICASONE PROPIONATE 50 UG/1
50 SPRAY, METERED NASAL
Qty: 48 | Refills: 0 | Status: ACTIVE | COMMUNITY
Start: 2020-10-02

## 2021-11-09 RX ORDER — ZOLPIDEM TARTRATE 5 MG/1
5 TABLET ORAL
Qty: 30 | Refills: 0 | Status: DISCONTINUED | COMMUNITY
Start: 2021-07-02 | End: 2021-11-09

## 2021-11-09 RX ORDER — TRIAMCINOLONE ACETONIDE 5 MG/G
0.5 CREAM TOPICAL
Qty: 30 | Refills: 0 | Status: DISCONTINUED | COMMUNITY
Start: 2021-08-10

## 2021-11-09 NOTE — ASSESSMENT
[FreeTextEntry1] : 70 y/o postmenopausal female with stage IA triple negative breast cancer, pT1cN0. Underwent left breast savita  wide lumpectomy on 06/28/21. \par 7/28/21 medi port placed\par Completed 4 cycles of Taxotere / Cytoxan on 10/8/21. \par Started adjuvant RT with Dr. Martin Conti of [facility] on 11/2/21. \par \par Overall feeling better, legs feel less heavy. Underdoing adjuvant RT\par CBC w dif reviewed from today WBC 5.8 K HGB 10.3 g HCT 34.4 %  K \par \par Plan:\par Completed 4 cycles of Taxotere and Cytoxan with onpro support 10/08/21\par Anemia due to chemotherapy - Hgb stable at 10.3 g/dL and is expected to improve with time\par RT to complete on 12/2/21\par Port flush schedule 11/26/21. Port removal ordered.\par RTO 3 months. \par

## 2021-11-09 NOTE — HISTORY OF PRESENT ILLNESS
[de-identified] : Ms. Gruber is a 70 year old female newly diagnosed with triple negative left breast cancer at age 70. She recently underwent bilateral screening mammogram 3/18/2021 which demonstrated a new mammographic mass at the 9:00 position of the left breast which was biopsied on 2021 and demonstrated poorly differentiated invasive carcinoma ER- CO- [id number] -\par \par 2021 Bilateral screening mammogram \par Left breast nodule\par Left breast sonography is recommended: BI-RADS-0: Incomplete: Needs Additional Imaging evaluation\par \par 2021 DEXA \par Osteopetrosis category with the lowest T score -2/9 within the lumbar spine\par \par 2021 Breast ultrasound Left Complete\par 9:00 5cm from the nipple, corresponding to the new mammographic mass there is a hypoechoic mass with irregular borders measuring 9 X 8 X 2mm cyst.\par Ultrasound- guided core biopsy is recommended\par There is a left axillary lymph node questionable mildly thickened cortex. Patient recently had a COVID vaccine and this may be reactive \par BI-RADS-4: Suspicious Findings 4-C high suspicion for malignancy \par \par 2021 Underwent Breast, left, 9 o'clock 5 cm FN, core biopsy\par - Invasive poorly differentiated ductal carcinoma\par - Farhad score 8/9 (3 + 3 + 2) in this limited material\par - Invasive tumor measures at least 0.7 cm\par - Ductal carcinoma in situ absent\par - Microcalcifications absent\par - Lymphovascular permeation by tumor not seen\par - As per outside pathology report:\par ER:  Negative 0% nuclear staining\par CO:  Negative 0% nuclear staining\par Her-2:  Negative (0 membrane staining)\par \par 2021 MR Breast w/wo IV Cont, Bilateral w/CAD \par 1.0 cm malignancy in the central left breast.\par No MRI evidence of multicentric or contralateral disease.  Study limited by moderate background enhancement.\par Nonspecific prominent left axillary lymph nodes which may be reactive in the setting of recent Covid vaccination.  Left axillary ultrasound evaluation and sampling of the most suspicious lymph node can be performed if requested.\par RECOMMENDATION:  Surgical or oncologic management. BI-RADS 6- Known Biopsy-Proven Malignancy\par \par 2021 US Axilla Only, Left             \par FINDINGS: No suspicious axillary lymph nodes are noted. A nonspecific benign-appearing 1.9 x 0.6 x 1.2 cm axillary lymph node is identified.\par IMPRESSION:No sonographically suspicious axillary lymph nodes.\par Surgical management of the known left breast cancer remains recommended.\par RECOMMENDATION: Surgical or oncologic management.BI-RADS 2 - Benign Finding(s)\par \par 21 Underwent left breast savita  wide lumpectomy with sentinel node biopsy.\par Surgical Final Report\par Final Diagnosis\par 1     Left sentinel lymph node #2 blue:\par - One lymph node, negative for metastatic carcinoma (0/1).\par 2     Left sentinel lymph node #1:\par - Two lymph node, negative for metastatic carcinoma (0/2).\par 3     Left breast savita wide lumpectomy:\par -        Invasive poorly differentiated mammary ductal carcinoma\par with apocrine features, [place] score 8/9 (3 + 3 + 2),\par adjacent to previous biopsy cavity.\par -       The  invasive tumor measures measuring 1.3 cm, one focus.\par -       Ductal carcinoma in situ, cribriform, low to\par intermediate nuclear grade, without calcifications or necrosis.\par -       All surgical resection margins are negative, the\par tumor is located 0.15 cm from anterior aspect of the resection,\par the closest margin; see also the the following resection\par margins(4-9).\par 4     Left breast deep posterior margin:\par - Breast tissue, negative for dysplasia/neoplasia.\par 5     Left breast anterior margin:\par - Breast tissue, negative for dysplasia/neoplasia.\par 6     Left breast lateral margin:\par - Breast tissue, negative for dysplasia/neoplasia.\par 7     Left breast superior margin:\par - Breast tissue, negative for dysplasia/neoplasia. Minute\par focus of radial scar, completely excised.\par 8     Left breast inferior margin;\par - Breast tissue, negative for dysplasia/neoplasia.\par 9     Left breast medial margin;\par - Breast tissue, negative for dysplasia/neoplasia.\par \par Final Diagnosis\par Left breast, Lumpectomy ( one slide) 1 slide, [id number], HE3E)\par - Invasive poorly differentiated ductal carcinoma with apocrine\par features ( see comment for further details)\par - As per report tumor is triple negative ( ER, CO and Her-2\par Negative)\par Comment :\par Only one slide is provided for review. Margin of the specimen in\par this one slide is negative for carcinoma. 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 S/p C4 Taxotere/Cytoxan with onpro support and use of Dignicap on 10/08/21 \par Started adjuvant RT 11/02/21 with radiation oncologist, Dr. Martin Conti at [facility].  Planning to finish 12/2/21.\par Reports "heaviness" in legs. Feels better today. \par Recent eye infection. Resolved with antibiotics. \par Received COVID-19 booster vaccine 11/07/21\par Port flush scheduled for 11/26/21. Patient requests for port removal. \par \par

## 2021-11-09 NOTE — CONSULT LETTER
[Dear  ___] : Dear  [unfilled], [Consult Letter:] : I had the pleasure of evaluating your patient, [unfilled]. [Please see my note below.] : Please see my note below. [Consult Closing:] : Thank you very much for allowing me to participate in the care of this patient.  If you have any questions, please do not hesitate to contact me. [Sincerely,] : Sincerely, [DrRosario  ___] : Dr. AYALA [FreeTextEntry3] : Yulia Hairston MD\par Medical Oncology/Hematology\par [facility] [place], [facility]\par \par \par [facility]\par

## 2021-11-09 NOTE — ADDENDUM
[FreeTextEntry1] : Documented by Garcia Mattson acting as scribe for Dr. Hairston on 11/09/2021. \par \par All Medical record entries made by the Scribe were at my, Dr. Hairston's, direction and personally dictated by me on 11/09/2021. I have reviewed the chart and agree that the record accurately reflects my personal performance of the history, physical exam, assessment and plan. I have also personally directed, reviewed, and agreed with the discharge instructions.

## 2021-11-15 NOTE — ASU PATIENT PROFILE, ADULT - TEACHING/LEARNING EDUCATIONAL LEVEL
high school/career/technical training No numbness/weakness in fingers, no loss of consciousness Dr. Ingram [phone]

## 2021-11-19 LAB
ALBUMIN SERPL ELPH-MCNC: 3.8 G/DL
ALP BLD-CCNC: 81 U/L
ALT SERPL-CCNC: 19 U/L
ANION GAP SERPL CALC-SCNC: 14 MMOL/L
AST SERPL-CCNC: 24 U/L
BILIRUB SERPL-MCNC: 0.2 MG/DL
BUN SERPL-MCNC: 16 MG/DL
CALCIUM SERPL-MCNC: 9.2 MG/DL
CHLORIDE SERPL-SCNC: 106 MMOL/L
CO2 SERPL-SCNC: 22 MMOL/L
CREAT SERPL-MCNC: 0.78 MG/DL
GLUCOSE SERPL-MCNC: 77 MG/DL
POTASSIUM SERPL-SCNC: 4.8 MMOL/L
PROT SERPL-MCNC: 6.3 G/DL
SODIUM SERPL-SCNC: 142 MMOL/L

## 2021-11-26 ENCOUNTER — RESULT REVIEW (OUTPATIENT)
Age: 71
End: 2021-11-26

## 2021-11-26 ENCOUNTER — LABORATORY RESULT (OUTPATIENT)
Age: 71
End: 2021-11-26

## 2021-11-26 ENCOUNTER — APPOINTMENT (OUTPATIENT)
Age: 71
End: 2021-11-26

## 2021-11-26 LAB
BASOPHILS # BLD AUTO: 0.1 K/UL — SIGNIFICANT CHANGE UP (ref 0–0.2)
BASOPHILS NFR BLD AUTO: 1.7 % — SIGNIFICANT CHANGE UP (ref 0–2)
EOSINOPHIL # BLD AUTO: 0.7 K/UL — HIGH (ref 0–0.5)
EOSINOPHIL NFR BLD AUTO: 10.9 % — HIGH (ref 0–6)
HCT VFR BLD CALC: 35.6 % — SIGNIFICANT CHANGE UP (ref 34.5–45)
HGB BLD-MCNC: 11.1 G/DL — LOW (ref 11.5–15.5)
LYMPHOCYTES # BLD AUTO: 1 K/UL — SIGNIFICANT CHANGE UP (ref 1–3.3)
LYMPHOCYTES # BLD AUTO: 16 % — SIGNIFICANT CHANGE UP (ref 13–44)
MCHC RBC-ENTMCNC: 31.3 G/DL — LOW (ref 32–36)
MCHC RBC-ENTMCNC: 32.1 PG — SIGNIFICANT CHANGE UP (ref 27–34)
MCV RBC AUTO: 102.6 FL — HIGH (ref 80–100)
MONOCYTES # BLD AUTO: 0.4 K/UL — SIGNIFICANT CHANGE UP (ref 0–0.9)
MONOCYTES NFR BLD AUTO: 6.9 % — SIGNIFICANT CHANGE UP (ref 2–14)
NEUTROPHILS # BLD AUTO: 4.1 K/UL — SIGNIFICANT CHANGE UP (ref 1.8–7.4)
NEUTROPHILS NFR BLD AUTO: 64.5 % — SIGNIFICANT CHANGE UP (ref 43–77)
PLATELET # BLD AUTO: 229 K/UL — SIGNIFICANT CHANGE UP (ref 150–400)
RBC # BLD: 3.47 M/UL — LOW (ref 3.8–5.2)
RBC # FLD: 13 % — SIGNIFICANT CHANGE UP (ref 10.3–14.5)
WBC # BLD: 6.4 K/UL — SIGNIFICANT CHANGE UP (ref 3.8–10.5)
WBC # FLD AUTO: 6.4 K/UL — SIGNIFICANT CHANGE UP (ref 3.8–10.5)

## 2021-12-15 ENCOUNTER — NON-APPOINTMENT (OUTPATIENT)
Age: 71
End: 2021-12-15

## 2021-12-15 ENCOUNTER — APPOINTMENT (OUTPATIENT)
Dept: RHEUMATOLOGY | Facility: CLINIC | Age: 71
End: 2021-12-15
Payer: MEDICARE

## 2021-12-15 VITALS
SYSTOLIC BLOOD PRESSURE: 127 MMHG | DIASTOLIC BLOOD PRESSURE: 72 MMHG | WEIGHT: 131 LBS | BODY MASS INDEX: 24.11 KG/M2 | HEART RATE: 89 BPM | OXYGEN SATURATION: 99 % | HEIGHT: 62 IN

## 2021-12-15 DIAGNOSIS — M79.2 NEURALGIA AND NEURITIS, UNSPECIFIED: ICD-10-CM

## 2021-12-15 PROCEDURE — 36415 COLL VENOUS BLD VENIPUNCTURE: CPT

## 2021-12-15 PROCEDURE — 99204 OFFICE O/P NEW MOD 45 MIN: CPT | Mod: 25

## 2021-12-15 NOTE — HISTORY OF PRESENT ILLNESS
[FreeTextEntry1] : 72 y/o female w/ HLD, osteoporosis, Hx of triple negative L breast CA referred to rheumatology for hair loss.\par Pt was diagnosed w/ breast CA in 4/2021 s/p lumpectomy 6/2021, with sentinel node biopsy s/p 4 cycles of chemo with Taxotere and Cytoxan (ended 10/2021), adjuvant radiation therapy (10/2021-12/2021).\par Pt has been having hair loss, tingling recurrent pain in the L arm since L lymph node surgery, generalized feeling of itchiness and dryness of the skin x 2 months.\par Reports joint pains in the knees (Hx of R knee trauma), hands. Pt was diagnosed with tendinitis of R hands. Denies joint swelling/redness. AM stiffness and gelling. Denies rashes. Dry eyes on restasis and dry mouth.\par Pt has follow up with dermatology tomorrow for hair loss.\par \par Patient denies fatigue, fever, chills, nasopharyngeal ulcers, chest pain, abdominal pain, cough, SOB, nausea, vomiting, diarrhea, blood in stool, hematuria, rash, photosensitivity, Raynaud's, alopecia, headaches, numbness/tingling, miscarriages, Hx of DVT/PEs.\par ROS negative unless otherwise noted above.\par No family Hx of autoimmune disease.\par \par

## 2021-12-15 NOTE — ASSESSMENT
[FreeTextEntry1] : 72 y/o female w/ HLD, osteoporosis, Hx of triple negative L breast CA referred to rheumatology for hair loss.\par Pt was diagnosed w/ breast CA in 4/2021 s/p lumpectomy 6/2021, with sentinel node biopsy s/p 4 cycles of chemo with Taxotere and Cytoxan (ended 10/2021), adjuvant radiation therapy (10/2021-12/2021).\par Pt has been having hair loss, tingling recurrent pain in the L arm since L lymph node surgery, generalized feeling of itchiness and dryness of the skin x 2 months.\par Reports joint pains in the knees (Hx of R knee trauma), hands. Pt was diagnosed with tendinitis of R hands. Denies joint swelling/redness. AM stiffness and gelling. Denies rashes. Dry eyes on restasis and dry mouth.\par Pt has follow up with dermatology tomorrow for hair loss.\par \par Patient's new rapid and significant onset of hair loss is most likely related to chemotherapy side effect. It would be unusual for new onset of underlying autoimmune disease or endocrine disease to present with such significant alopecia without other systemic symptoms, especially given timing of the symptoms after chemo. Pt's L arm pain and pains in hands and feet are neuropathic related to her therapy and is appropriately treated with gabapentin by neurology.\par \par - Obtain bloodwork to rule out any autoimmune causes of alopecia\par - Follow up with dermatology tomorrow as scheduled\par - Will contact pt with results of above workup. If unremarkable, pt does not need to follow up regularly with rheumatology. RTC PRN.\par

## 2021-12-16 ENCOUNTER — APPOINTMENT (OUTPATIENT)
Dept: DERMATOLOGY | Facility: CLINIC | Age: 71
End: 2021-12-16
Payer: MEDICARE

## 2021-12-16 LAB
ALBUMIN SERPL ELPH-MCNC: 4.4 G/DL
ALP BLD-CCNC: 101 U/L
ALT SERPL-CCNC: 18 U/L
ANA SER IF-ACNC: NEGATIVE
ANION GAP SERPL CALC-SCNC: 11 MMOL/L
AST SERPL-CCNC: 20 U/L
BASOPHILS # BLD AUTO: 0.13 K/UL
BASOPHILS NFR BLD AUTO: 1.8 %
BILIRUB SERPL-MCNC: 0.2 MG/DL
BUN SERPL-MCNC: 18 MG/DL
C3 SERPL-MCNC: 143 MG/DL
C4 SERPL-MCNC: 30 MG/DL
CALCIUM SERPL-MCNC: 9.8 MG/DL
CHLORIDE SERPL-SCNC: 105 MMOL/L
CO2 SERPL-SCNC: 27 MMOL/L
CREAT SERPL-MCNC: 0.76 MG/DL
CRP SERPL-MCNC: <3 MG/L
EOSINOPHIL # BLD AUTO: 0.48 K/UL
EOSINOPHIL NFR BLD AUTO: 6.5 %
ERYTHROCYTE [SEDIMENTATION RATE] IN BLOOD BY WESTERGREN METHOD: 23 MM/HR
FERRITIN SERPL-MCNC: 41 NG/ML
FOLATE SERPL-MCNC: 11.2 NG/ML
GLUCOSE SERPL-MCNC: 80 MG/DL
HCT VFR BLD CALC: 39.6 %
HGB BLD-MCNC: 12.3 G/DL
IMM GRANULOCYTES NFR BLD AUTO: 0.1 %
IRON SATN MFR SERPL: 23 %
IRON SERPL-MCNC: 88 UG/DL
LYMPHOCYTES # BLD AUTO: 1.12 K/UL
LYMPHOCYTES NFR BLD AUTO: 15.2 %
MAN DIFF?: NORMAL
MCHC RBC-ENTMCNC: 31.1 GM/DL
MCHC RBC-ENTMCNC: 31.7 PG
MCV RBC AUTO: 102.1 FL
MONOCYTES # BLD AUTO: 0.63 K/UL
MONOCYTES NFR BLD AUTO: 8.6 %
NEUTROPHILS # BLD AUTO: 4.98 K/UL
NEUTROPHILS NFR BLD AUTO: 67.8 %
PLATELET # BLD AUTO: 260 K/UL
POTASSIUM SERPL-SCNC: 5.1 MMOL/L
PROT SERPL-MCNC: 6.9 G/DL
RBC # BLD: 3.88 M/UL
RBC # FLD: 12.8 %
SODIUM SERPL-SCNC: 143 MMOL/L
THYROGLOB AB SERPL-ACNC: <20 IU/ML
THYROPEROXIDASE AB SERPL IA-ACNC: <10 IU/ML
TIBC SERPL-MCNC: 374 UG/DL
UIBC SERPL-MCNC: 286 UG/DL
VIT B12 SERPL-MCNC: 569 PG/ML
WBC # FLD AUTO: 7.35 K/UL

## 2021-12-16 PROCEDURE — 99213 OFFICE O/P EST LOW 20 MIN: CPT

## 2021-12-17 ENCOUNTER — NON-APPOINTMENT (OUTPATIENT)
Age: 71
End: 2021-12-17

## 2021-12-17 LAB
DSDNA AB SER-ACNC: 12 IU/ML
ENA RNP AB SER IA-ACNC: 0.3 AL
ENA SM AB SER IA-ACNC: <0.2 AL
ENA SS-A AB SER IA-ACNC: 2.6 AL
ENA SS-B AB SER IA-ACNC: <0.2 AL

## 2022-01-06 ENCOUNTER — OUTPATIENT (OUTPATIENT)
Dept: OUTPATIENT SERVICES | Facility: HOSPITAL | Age: 72
LOS: 1 days | Discharge: HOME | End: 2022-01-06

## 2022-01-06 DIAGNOSIS — Z98.890 OTHER SPECIFIED POSTPROCEDURAL STATES: Chronic | ICD-10-CM

## 2022-01-06 DIAGNOSIS — C50.919 MALIGNANT NEOPLASM OF UNSPECIFIED SITE OF UNSPECIFIED FEMALE BREAST: ICD-10-CM

## 2022-02-03 ENCOUNTER — APPOINTMENT (OUTPATIENT)
Age: 72
End: 2022-02-03

## 2022-02-03 ENCOUNTER — RESULT REVIEW (OUTPATIENT)
Age: 72
End: 2022-02-03

## 2022-02-03 ENCOUNTER — APPOINTMENT (OUTPATIENT)
Dept: HEMATOLOGY ONCOLOGY | Facility: CLINIC | Age: 72
End: 2022-02-03
Payer: MEDICARE

## 2022-02-03 ENCOUNTER — APPOINTMENT (OUTPATIENT)
Dept: SURGERY | Facility: CLINIC | Age: 72
End: 2022-02-03
Payer: MEDICARE

## 2022-02-03 VITALS
OXYGEN SATURATION: 98 % | DIASTOLIC BLOOD PRESSURE: 72 MMHG | HEIGHT: 62 IN | SYSTOLIC BLOOD PRESSURE: 113 MMHG | WEIGHT: 131 LBS | HEART RATE: 96 BPM | BODY MASS INDEX: 24.11 KG/M2

## 2022-02-03 DIAGNOSIS — Z98.890 OTHER SPECIFIED POSTPROCEDURAL STATES: ICD-10-CM

## 2022-02-03 DIAGNOSIS — L65.9 NONSCARRING HAIR LOSS, UNSPECIFIED: ICD-10-CM

## 2022-02-03 DIAGNOSIS — R79.0 ABNORMAL LVL OF BLOOD MINERAL: ICD-10-CM

## 2022-02-03 LAB
BASOPHILS # BLD AUTO: 0.1 K/UL — SIGNIFICANT CHANGE UP (ref 0–0.2)
BASOPHILS NFR BLD AUTO: 1.9 % — SIGNIFICANT CHANGE UP (ref 0–2)
EOSINOPHIL # BLD AUTO: 0.4 K/UL — SIGNIFICANT CHANGE UP (ref 0–0.5)
EOSINOPHIL NFR BLD AUTO: 6.1 % — HIGH (ref 0–6)
HCT VFR BLD CALC: 40.4 % — SIGNIFICANT CHANGE UP (ref 34.5–45)
HGB BLD-MCNC: 12.9 G/DL — SIGNIFICANT CHANGE UP (ref 11.5–15.5)
LYMPHOCYTES # BLD AUTO: 1.3 K/UL — SIGNIFICANT CHANGE UP (ref 1–3.3)
LYMPHOCYTES # BLD AUTO: 21.9 % — SIGNIFICANT CHANGE UP (ref 13–44)
MCHC RBC-ENTMCNC: 30.5 PG — SIGNIFICANT CHANGE UP (ref 27–34)
MCHC RBC-ENTMCNC: 32 G/DL — SIGNIFICANT CHANGE UP (ref 32–36)
MCV RBC AUTO: 95.2 FL — SIGNIFICANT CHANGE UP (ref 80–100)
MONOCYTES # BLD AUTO: 0.4 K/UL — SIGNIFICANT CHANGE UP (ref 0–0.9)
MONOCYTES NFR BLD AUTO: 7.2 % — SIGNIFICANT CHANGE UP (ref 2–14)
NEUTROPHILS # BLD AUTO: 3.7 K/UL — SIGNIFICANT CHANGE UP (ref 1.8–7.4)
NEUTROPHILS NFR BLD AUTO: 62.9 % — SIGNIFICANT CHANGE UP (ref 43–77)
PLATELET # BLD AUTO: 244 K/UL — SIGNIFICANT CHANGE UP (ref 150–400)
RBC # BLD: 4.24 M/UL — SIGNIFICANT CHANGE UP (ref 3.8–5.2)
RBC # FLD: 11.8 % — SIGNIFICANT CHANGE UP (ref 10.3–14.5)
WBC # BLD: 5.8 K/UL — SIGNIFICANT CHANGE UP (ref 3.8–10.5)
WBC # FLD AUTO: 5.8 K/UL — SIGNIFICANT CHANGE UP (ref 3.8–10.5)

## 2022-02-03 PROCEDURE — 99214 OFFICE O/P EST MOD 30 MIN: CPT

## 2022-02-03 NOTE — CONSULT LETTER
[Dear  ___] : Dear  [unfilled], [Consult Letter:] : I had the pleasure of evaluating your patient, [unfilled]. [Please see my note below.] : Please see my note below. [Consult Closing:] : Thank you very much for allowing me to participate in the care of this patient.  If you have any questions, please do not hesitate to contact me. [Sincerely,] : Sincerely, [DrRosario  ___] : Dr. AYALA [FreeTextEntry3] : Yulia Hairston MD\par Medical Oncology/Hematology\par [facility] [place], [facility]\par \par \par [facility]\par

## 2022-02-03 NOTE — PHYSICAL EXAM
[Normocephalic] : normocephalic [Atraumatic] : atraumatic [EOMI] : extra ocular movement intact [PERRL] : pupils equal, round and reactive to light [Sclera nonicteric] : sclera nonicteric [Supple] : supple [No Supraclavicular Adenopathy] : no supraclavicular adenopathy [Examined in the supine and seated position] : examined in the supine and seated position [No dominant masses] : no dominant masses in right breast  [No dominant masses] : no dominant masses left breast [No Nipple Retraction] : no left nipple retraction [No Nipple Discharge] : no left nipple discharge [No Axillary Lymphadenopathy] : no left axillary lymphadenopathy [No Edema] : no edema [No Rashes] : no rashes [No Ulceration] : no ulceration [de-identified] : No supraclavicular or axillary adenopathy. No dominant masses, normal to palpation. Everted nipple without discharge. No skin changes.\par \par  [de-identified] : Mild hyperpigmentation from radiation therapy.

## 2022-02-03 NOTE — ASSESSMENT
[FreeTextEntry1] : 70 yo postmenopausal female with Stage 1 triple negative breast cancer, underwent breast conservation surgery with lumpectomy and SLNB on 6/21/2021 0/3 nodes.  She completed chemotherapy and radiation therapy.  Recommendation for mammogram/sonogram in May 2022 and followup in 6 months if imaging is benign.\par 1. follow up with radiation oncology\par 2. Clinical examination in 6 months\par 3. Bilateral diagnostic mammogram/ultrasound 5/2022\par 4.  Follow up with medical oncology\par

## 2022-02-03 NOTE — END OF VISIT
[Time Spent: ___ minutes] : I have spent [unfilled] minutes of time on the encounter. Assessment & Plan:     Hydrocele    Mallika Delgado is s/p Left Hydrocelectomy with Dr. Gal Stanton on 11/29/18. Pathology is consistent with Hydrocele. Scrotum w/ moderate amount of edema, firm. No erythema or induration noted. Discussed will take approx 6 weeks for swelling to improve. Advised to continue good scrotal support, anti-inflammatories for pain. Return in about 6 weeks (around 1/18/2019) for s/p hydrocele.     CYNDI Yip  Urology

## 2022-02-03 NOTE — ASSESSMENT
[FreeTextEntry1] : 72 y/o postmenopausal female with stage IA triple negative breast cancer, pT1cN0. Underwent left breast savita  wide lumpectomy on 06/28/21. \par 7/28/21 medi port placed\par Completed 4 cycles of Taxotere / Cytoxan on 10/8/21. \par Completed adjuvant RT with Dr. Martin Conti of [facility] on 12/1/21. \par \par Overall feeling better since finishing RT, 12/01/2021. Notes hair loss. \par CBC w dif reviewed from today WBC 5.8 K HGB 12.3 g HCT 40.4 %  K \par \par Plan:\par Hair loss likely secondary to chemotherapy, though she had used dignicap during chemotherapy, this occurred primary after stopping chemotherapy.  Also has low normal serum ferritin levels - start slowFe every other day.  She will see derm also.\par Mammo/Sono due May 2022 and advised to follow up with Dr. Dorsey afterwards \par Port flush due today - Will discuss about port removal next visit. \par RTO 3 months. \par

## 2022-02-03 NOTE — HISTORY OF PRESENT ILLNESS
[FreeTextEntry1] : I had the pleasure of seeing KRISTIE COATES  in the office today for a followup visit.\par \par Kristie was diagnosed and treated for poorly differentiated triple negative breast cancer at age 70. She underwent left breast conservation with localized lumpectomy and SLNB 6/21/2021. She completed chemotherapy for her TNBC with Dr Hairston and then underwent radiation therapy with Dr Longoria at [facility].  She completed radiation on 12/1/2021.\par \par She has healed well from surgery. \par Pathology:\par Final pathology demonstrated 1.3cm IDC grade with DCIS 3 triple negative with clear margins (after assessing additional margins) and 0/3 sentinel nodes. \par  Left sentinel node 1. negative for carcinoma\par Left sentinel node 2 (0/2) negative for carcinoma\par Left breast savita wide lumpectomy: Invasive poorly differentiated carcinoma with DCIS. The invasive tumor measures 1.3cm one focus. All surgical resection margins are negative. the tumor is located 0.15 cm from anterior aspect of the resection, the closest margin; ER negative NC negative [id number] negative\par Anterior margin: negative\par Posterior deep margin: negative\par Lateral margin: negative\par Medial margin: negative\par Superior margin: negative\par Inferior margin: negative\par \par We reviewed clinical breast exam and clinical breast exam is benign.  She has mild hyperpigmentation from radiation therapy.  She will undergo diagnostic mammogram and sonogram in May and followup in August if imaging is benign.\par All questions were answered.

## 2022-02-03 NOTE — HISTORY OF PRESENT ILLNESS
[de-identified] : Ms. Gruber is a 70 year old female newly diagnosed with triple negative left breast cancer at age 70. She recently underwent bilateral screening mammogram 3/18/2021 which demonstrated a new mammographic mass at the 9:00 position of the left breast which was biopsied on 2021 and demonstrated poorly differentiated invasive carcinoma ER- HI- [id number] -\par \par 2021 Bilateral screening mammogram \par Left breast nodule\par Left breast sonography is recommended: BI-RADS-0: Incomplete: Needs Additional Imaging evaluation\par \par 2021 DEXA \par Osteopetrosis category with the lowest T score -2/9 within the lumbar spine\par \par 2021 Breast ultrasound Left Complete\par 9:00 5cm from the nipple, corresponding to the new mammographic mass there is a hypoechoic mass with irregular borders measuring 9 X 8 X 2mm cyst.\par Ultrasound- guided core biopsy is recommended\par There is a left axillary lymph node questionable mildly thickened cortex. Patient recently had a COVID vaccine and this may be reactive \par BI-RADS-4: Suspicious Findings 4-C high suspicion for malignancy \par \par 2021 Underwent Breast, left, 9 o'clock 5 cm FN, core biopsy\par - Invasive poorly differentiated ductal carcinoma\par - Farhad score 8/9 (3 + 3 + 2) in this limited material\par - Invasive tumor measures at least 0.7 cm\par - Ductal carcinoma in situ absent\par - Microcalcifications absent\par - Lymphovascular permeation by tumor not seen\par - As per outside pathology report:\par ER:  Negative 0% nuclear staining\par HI:  Negative 0% nuclear staining\par Her-2:  Negative (0 membrane staining)\par \par 2021 MR Breast w/wo IV Cont, Bilateral w/CAD \par 1.0 cm malignancy in the central left breast.\par No MRI evidence of multicentric or contralateral disease.  Study limited by moderate background enhancement.\par Nonspecific prominent left axillary lymph nodes which may be reactive in the setting of recent Covid vaccination.  Left axillary ultrasound evaluation and sampling of the most suspicious lymph node can be performed if requested.\par RECOMMENDATION:  Surgical or oncologic management. BI-RADS 6- Known Biopsy-Proven Malignancy\par \par 2021 US Axilla Only, Left             \par FINDINGS: No suspicious axillary lymph nodes are noted. A nonspecific benign-appearing 1.9 x 0.6 x 1.2 cm axillary lymph node is identified.\par IMPRESSION:No sonographically suspicious axillary lymph nodes.\par Surgical management of the known left breast cancer remains recommended.\par RECOMMENDATION: Surgical or oncologic management.BI-RADS 2 - Benign Finding(s)\par \par 21 Underwent left breast savita  wide lumpectomy with sentinel node biopsy.\par Surgical Final Report\par Final Diagnosis\par 1     Left sentinel lymph node #2 blue:\par - One lymph node, negative for metastatic carcinoma (0/1).\par 2     Left sentinel lymph node #1:\par - Two lymph node, negative for metastatic carcinoma (0/2).\par 3     Left breast savita wide lumpectomy:\par -        Invasive poorly differentiated mammary ductal carcinoma\par with apocrine features, [place] score 8/9 (3 + 3 + 2),\par adjacent to previous biopsy cavity.\par -       The  invasive tumor measures measuring 1.3 cm, one focus.\par -       Ductal carcinoma in situ, cribriform, low to\par intermediate nuclear grade, without calcifications or necrosis.\par -       All surgical resection margins are negative, the\par tumor is located 0.15 cm from anterior aspect of the resection,\par the closest margin; see also the the following resection\par margins(4-9).\par 4     Left breast deep posterior margin:\par - Breast tissue, negative for dysplasia/neoplasia.\par 5     Left breast anterior margin:\par - Breast tissue, negative for dysplasia/neoplasia.\par 6     Left breast lateral margin:\par - Breast tissue, negative for dysplasia/neoplasia.\par 7     Left breast superior margin:\par - Breast tissue, negative for dysplasia/neoplasia. Minute\par focus of radial scar, completely excised.\par 8     Left breast inferior margin;\par - Breast tissue, negative for dysplasia/neoplasia.\par 9     Left breast medial margin;\par - Breast tissue, negative for dysplasia/neoplasia.\par \par Final Diagnosis\par Left breast, Lumpectomy ( one slide) 1 slide, [id number], HE3E)\par - Invasive poorly differentiated ductal carcinoma with apocrine\par features ( see comment for further details)\par - As per report tumor is triple negative ( ER, HI and Her-2\par Negative)\par Comment :\par Only one slide is provided for review. Margin of the specimen in\par this one slide is negative for carcinoma. 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 C\par Completed RT on 12/01/2021.\par Feels overall better since finishing RT. \par Good appetite\par Following up with Dermatology for hair loss on 02/07/2022.\par Received COVID-19 booster vaccine 11/07/21\par Port flush due today. Patient inquiring about port removal.\par \par \par

## 2022-02-07 ENCOUNTER — APPOINTMENT (OUTPATIENT)
Dept: DERMATOLOGY | Facility: CLINIC | Age: 72
End: 2022-02-07
Payer: MEDICARE

## 2022-02-07 LAB
ALBUMIN SERPL ELPH-MCNC: 4.4 G/DL
ALP BLD-CCNC: 88 U/L
ALT SERPL-CCNC: 15 U/L
ANION GAP SERPL CALC-SCNC: 12 MMOL/L
AST SERPL-CCNC: 16 U/L
BILIRUB SERPL-MCNC: 0.2 MG/DL
BUN SERPL-MCNC: 19 MG/DL
CALCIUM SERPL-MCNC: 9.7 MG/DL
CHLORIDE SERPL-SCNC: 107 MMOL/L
CO2 SERPL-SCNC: 25 MMOL/L
CREAT SERPL-MCNC: 0.82 MG/DL
GLUCOSE SERPL-MCNC: 96 MG/DL
POTASSIUM SERPL-SCNC: 5 MMOL/L
PROT SERPL-MCNC: 6.4 G/DL
SODIUM SERPL-SCNC: 144 MMOL/L

## 2022-02-07 PROCEDURE — 99213 OFFICE O/P EST LOW 20 MIN: CPT

## 2022-03-07 ENCOUNTER — APPOINTMENT (OUTPATIENT)
Dept: OBGYN | Facility: CLINIC | Age: 72
End: 2022-03-07
Payer: MEDICARE

## 2022-03-07 VITALS
SYSTOLIC BLOOD PRESSURE: 123 MMHG | WEIGHT: 132 LBS | DIASTOLIC BLOOD PRESSURE: 86 MMHG | BODY MASS INDEX: 24.29 KG/M2 | HEIGHT: 62 IN | HEART RATE: 91 BPM

## 2022-03-07 DIAGNOSIS — Z78.0 ASYMPTOMATIC MENOPAUSAL STATE: ICD-10-CM

## 2022-03-07 DIAGNOSIS — Z01.419 ENCOUNTER FOR GYNECOLOGICAL EXAMINATION (GENERAL) (ROUTINE) W/OUT ABNORMAL FINDINGS: ICD-10-CM

## 2022-03-07 PROCEDURE — 99213 OFFICE O/P EST LOW 20 MIN: CPT

## 2022-03-07 NOTE — PLAN
[FreeTextEntry1] : Well woman exam\par \par pap recommended with Hpv history, pt refused- has been through a lot with breast cancer, can't handle it\par bone density -utd, pt never saw Dr. Mullen, she is aware of osteoporosis and may call him\par recommended taking vit. D 2000 units daily and through diet obtain 1200 mg of calcium along with 2.5 hours of weight bearing exercise per week\par return in 1 year\par triamcinolone .05% ointment refilled\par

## 2022-03-07 NOTE — HISTORY OF PRESENT ILLNESS
[Patient reported bone density results were abnormal] : Patient reported bone density results were abnormal [FreeTextEntry1] : 70 yo here for av. She was dx with left breast cancer in March 2021, triple negative, BRCA1 and 2 negative. Had lumpectomy, chemo/radiation. Being followed by Dr. Dorsey, due May for visit with her and mammo. Pt had + hpv, neg pap in 2020, last years pap had hpv negative, nml pap. \par She occ. has itching to vulva and uses triamcinolone cream .05% for relief-needs refill. [BoneDensityDate] : 2021

## 2022-03-07 NOTE — PHYSICAL EXAM
[Chaperone Declined] : Patient declined chaperone [Appropriately responsive] : appropriately responsive [Alert] : alert [No Acute Distress] : no acute distress [No Lymphadenopathy] : no lymphadenopathy [Soft] : soft [Non-tender] : non-tender [Non-distended] : non-distended [No HSM] : No HSM [No Lesions] : no lesions [No Mass] : no mass [Oriented x3] : oriented x3 [Examination Of The Breasts] : a normal appearance [No Masses] : no breast masses were palpable [Labia Majora] : normal [Labia Minora] : normal [Atrophy] : atrophy [Normal] : normal [Uterine Adnexae] : normal

## 2022-03-18 ENCOUNTER — OUTPATIENT (OUTPATIENT)
Dept: OUTPATIENT SERVICES | Facility: HOSPITAL | Age: 72
LOS: 1 days | Discharge: HOME | End: 2022-03-18

## 2022-03-18 DIAGNOSIS — Z98.890 OTHER SPECIFIED POSTPROCEDURAL STATES: Chronic | ICD-10-CM

## 2022-03-18 DIAGNOSIS — C50.919 MALIGNANT NEOPLASM OF UNSPECIFIED SITE OF UNSPECIFIED FEMALE BREAST: ICD-10-CM

## 2022-03-22 ENCOUNTER — APPOINTMENT (OUTPATIENT)
Age: 72
End: 2022-03-22

## 2022-04-22 ENCOUNTER — APPOINTMENT (OUTPATIENT)
Dept: DERMATOLOGY | Facility: CLINIC | Age: 72
End: 2022-04-22
Payer: MEDICARE

## 2022-04-22 PROCEDURE — 99213 OFFICE O/P EST LOW 20 MIN: CPT

## 2022-04-27 ENCOUNTER — OUTPATIENT (OUTPATIENT)
Dept: OUTPATIENT SERVICES | Facility: HOSPITAL | Age: 72
LOS: 1 days | Discharge: HOME | End: 2022-04-27

## 2022-04-27 DIAGNOSIS — D50.9 IRON DEFICIENCY ANEMIA, UNSPECIFIED: ICD-10-CM

## 2022-04-27 DIAGNOSIS — Z98.890 OTHER SPECIFIED POSTPROCEDURAL STATES: Chronic | ICD-10-CM

## 2022-04-27 DIAGNOSIS — C50.919 MALIGNANT NEOPLASM OF UNSPECIFIED SITE OF UNSPECIFIED FEMALE BREAST: ICD-10-CM

## 2022-05-02 ENCOUNTER — RESULT REVIEW (OUTPATIENT)
Age: 72
End: 2022-05-02

## 2022-05-02 ENCOUNTER — APPOINTMENT (OUTPATIENT)
Dept: MAMMOGRAPHY | Facility: CLINIC | Age: 72
End: 2022-05-02
Payer: MEDICARE

## 2022-05-02 ENCOUNTER — OUTPATIENT (OUTPATIENT)
Dept: OUTPATIENT SERVICES | Facility: HOSPITAL | Age: 72
LOS: 1 days | End: 2022-05-02
Payer: MEDICARE

## 2022-05-02 ENCOUNTER — APPOINTMENT (OUTPATIENT)
Dept: ULTRASOUND IMAGING | Facility: CLINIC | Age: 72
End: 2022-05-02
Payer: MEDICARE

## 2022-05-02 DIAGNOSIS — Z00.00 ENCOUNTER FOR GENERAL ADULT MEDICAL EXAMINATION WITHOUT ABNORMAL FINDINGS: ICD-10-CM

## 2022-05-02 DIAGNOSIS — Z98.890 OTHER SPECIFIED POSTPROCEDURAL STATES: Chronic | ICD-10-CM

## 2022-05-02 PROCEDURE — G0279: CPT | Mod: 26

## 2022-05-02 PROCEDURE — G0279: CPT

## 2022-05-02 PROCEDURE — 76641 ULTRASOUND BREAST COMPLETE: CPT

## 2022-05-02 PROCEDURE — 77066 DX MAMMO INCL CAD BI: CPT | Mod: 26

## 2022-05-02 PROCEDURE — 77066 DX MAMMO INCL CAD BI: CPT

## 2022-05-02 PROCEDURE — 76641 ULTRASOUND BREAST COMPLETE: CPT | Mod: 26,50

## 2022-05-04 ENCOUNTER — RESULT REVIEW (OUTPATIENT)
Age: 72
End: 2022-05-04

## 2022-05-04 ENCOUNTER — APPOINTMENT (OUTPATIENT)
Dept: HEMATOLOGY ONCOLOGY | Facility: CLINIC | Age: 72
End: 2022-05-04
Payer: MEDICARE

## 2022-05-04 ENCOUNTER — APPOINTMENT (OUTPATIENT)
Dept: HEMATOLOGY ONCOLOGY | Facility: CLINIC | Age: 72
End: 2022-05-04

## 2022-05-04 ENCOUNTER — APPOINTMENT (OUTPATIENT)
Age: 72
End: 2022-05-04

## 2022-05-04 VITALS
DIASTOLIC BLOOD PRESSURE: 76 MMHG | WEIGHT: 133 LBS | BODY MASS INDEX: 24.48 KG/M2 | HEART RATE: 78 BPM | OXYGEN SATURATION: 99 % | HEIGHT: 62 IN | SYSTOLIC BLOOD PRESSURE: 117 MMHG

## 2022-05-04 LAB
BASOPHILS # BLD AUTO: 0.1 K/UL — SIGNIFICANT CHANGE UP (ref 0–0.2)
BASOPHILS NFR BLD AUTO: 1.6 % — SIGNIFICANT CHANGE UP (ref 0–2)
EOSINOPHIL # BLD AUTO: 0.3 K/UL — SIGNIFICANT CHANGE UP (ref 0–0.5)
EOSINOPHIL NFR BLD AUTO: 4.2 % — SIGNIFICANT CHANGE UP (ref 0–6)
HCT VFR BLD CALC: 39.7 % — SIGNIFICANT CHANGE UP (ref 34.5–45)
HGB BLD-MCNC: 13.3 G/DL — SIGNIFICANT CHANGE UP (ref 11.5–15.5)
LYMPHOCYTES # BLD AUTO: 2 K/UL — SIGNIFICANT CHANGE UP (ref 1–3.3)
LYMPHOCYTES # BLD AUTO: 26 % — SIGNIFICANT CHANGE UP (ref 13–44)
MCHC RBC-ENTMCNC: 33.2 PG — SIGNIFICANT CHANGE UP (ref 27–34)
MCHC RBC-ENTMCNC: 33.5 G/DL — SIGNIFICANT CHANGE UP (ref 32–36)
MCV RBC AUTO: 99.4 FL — SIGNIFICANT CHANGE UP (ref 80–100)
MONOCYTES # BLD AUTO: 0.5 K/UL — SIGNIFICANT CHANGE UP (ref 0–0.9)
MONOCYTES NFR BLD AUTO: 6.2 % — SIGNIFICANT CHANGE UP (ref 2–14)
NEUTROPHILS # BLD AUTO: 4.8 K/UL — SIGNIFICANT CHANGE UP (ref 1.8–7.4)
NEUTROPHILS NFR BLD AUTO: 62.1 % — SIGNIFICANT CHANGE UP (ref 43–77)
PLATELET # BLD AUTO: 248 K/UL — SIGNIFICANT CHANGE UP (ref 150–400)
RBC # BLD: 4 M/UL — SIGNIFICANT CHANGE UP (ref 3.8–5.2)
RBC # FLD: 11.9 % — SIGNIFICANT CHANGE UP (ref 10.3–14.5)
WBC # BLD: 7.7 K/UL — SIGNIFICANT CHANGE UP (ref 3.8–10.5)
WBC # FLD AUTO: 7.7 K/UL — SIGNIFICANT CHANGE UP (ref 3.8–10.5)

## 2022-05-04 PROCEDURE — 99214 OFFICE O/P EST MOD 30 MIN: CPT

## 2022-05-04 RX ORDER — DEXAMETHASONE 4 MG/1
4 TABLET ORAL
Qty: 60 | Refills: 0 | Status: DISCONTINUED | COMMUNITY
Start: 2021-07-29 | End: 2022-05-04

## 2022-05-04 RX ORDER — DIPHENHYDRAMINE HYDROCHLORIDE AND LIDOCAINE HYDROCHLORIDE AND ALUMINUM HYDROXIDE AND MAGNESIUM HYDRO
KIT
Qty: 237 | Refills: 0 | Status: DISCONTINUED | COMMUNITY
Start: 2021-08-11 | End: 2022-05-04

## 2022-05-05 LAB
ALBUMIN SERPL ELPH-MCNC: 4.6 G/DL — SIGNIFICANT CHANGE UP (ref 3.3–5)
ALP SERPL-CCNC: 85 U/L — SIGNIFICANT CHANGE UP (ref 40–120)
ALT FLD-CCNC: 16 U/L — SIGNIFICANT CHANGE UP (ref 10–45)
ANION GAP SERPL CALC-SCNC: 12 MMOL/L — SIGNIFICANT CHANGE UP (ref 5–17)
AST SERPL-CCNC: 22 U/L — SIGNIFICANT CHANGE UP (ref 10–40)
BILIRUB SERPL-MCNC: 0.2 MG/DL — SIGNIFICANT CHANGE UP (ref 0.2–1.2)
BUN SERPL-MCNC: 18 MG/DL — SIGNIFICANT CHANGE UP (ref 7–23)
CALCIUM SERPL-MCNC: 9.7 MG/DL — SIGNIFICANT CHANGE UP (ref 8.4–10.5)
CHLORIDE SERPL-SCNC: 105 MMOL/L — SIGNIFICANT CHANGE UP (ref 96–108)
CO2 SERPL-SCNC: 24 MMOL/L — SIGNIFICANT CHANGE UP (ref 22–31)
CREAT SERPL-MCNC: 0.7 MG/DL — SIGNIFICANT CHANGE UP (ref 0.5–1.3)
EGFR: 93 ML/MIN/1.73M2 — SIGNIFICANT CHANGE UP
FERRITIN SERPL-MCNC: 65 NG/ML — SIGNIFICANT CHANGE UP (ref 15–150)
GLUCOSE SERPL-MCNC: 78 MG/DL — SIGNIFICANT CHANGE UP (ref 70–99)
IRON SATN MFR SERPL: 23 % — SIGNIFICANT CHANGE UP (ref 14–50)
IRON SATN MFR SERPL: 89 UG/DL — SIGNIFICANT CHANGE UP (ref 30–160)
POTASSIUM SERPL-MCNC: 4.8 MMOL/L — SIGNIFICANT CHANGE UP (ref 3.5–5.3)
POTASSIUM SERPL-SCNC: 4.8 MMOL/L — SIGNIFICANT CHANGE UP (ref 3.5–5.3)
PROT SERPL-MCNC: 7.2 G/DL — SIGNIFICANT CHANGE UP (ref 6–8.3)
SODIUM SERPL-SCNC: 142 MMOL/L — SIGNIFICANT CHANGE UP (ref 135–145)
TIBC SERPL-MCNC: 386 UG/DL — SIGNIFICANT CHANGE UP (ref 220–430)
UIBC SERPL-MCNC: 298 UG/DL — SIGNIFICANT CHANGE UP (ref 110–370)
VIT B12 SERPL-MCNC: 574 PG/ML — SIGNIFICANT CHANGE UP (ref 232–1245)

## 2022-05-09 ENCOUNTER — NON-APPOINTMENT (OUTPATIENT)
Age: 72
End: 2022-05-09

## 2022-05-09 ENCOUNTER — RESULT REVIEW (OUTPATIENT)
Age: 72
End: 2022-05-09

## 2022-05-12 NOTE — HISTORY OF PRESENT ILLNESS
[de-identified] : Ms. Gruber is a 70 year old female newly diagnosed with triple negative left breast cancer at age 70. She recently underwent bilateral screening mammogram 3/18/2021 which demonstrated a new mammographic mass at the 9:00 position of the left breast which was biopsied on 2021 and demonstrated poorly differentiated invasive carcinoma ER- TX- [id number] -\par \par 2021 Bilateral screening mammogram \par Left breast nodule\par Left breast sonography is recommended: BI-RADS-0: Incomplete: Needs Additional Imaging evaluation\par \par 2021 DEXA \par Osteopetrosis category with the lowest T score -2/9 within the lumbar spine\par \par 2021 Breast ultrasound Left Complete\par 9:00 5cm from the nipple, corresponding to the new mammographic mass there is a hypoechoic mass with irregular borders measuring 9 X 8 X 2mm cyst.\par Ultrasound- guided core biopsy is recommended\par There is a left axillary lymph node questionable mildly thickened cortex. Patient recently had a COVID vaccine and this may be reactive \par BI-RADS-4: Suspicious Findings 4-C high suspicion for malignancy \par \par 2021 Underwent Breast, left, 9 o'clock 5 cm FN, core biopsy\par - Invasive poorly differentiated ductal carcinoma\par - Farhad score 8/9 (3 + 3 + 2) in this limited material\par - Invasive tumor measures at least 0.7 cm\par - Ductal carcinoma in situ absent\par - Microcalcifications absent\par - Lymphovascular permeation by tumor not seen\par - As per outside pathology report:\par ER:  Negative 0% nuclear staining\par TX:  Negative 0% nuclear staining\par Her-2:  Negative (0 membrane staining)\par \par 2021 MR Breast w/wo IV Cont, Bilateral w/CAD \par 1.0 cm malignancy in the central left breast.\par No MRI evidence of multicentric or contralateral disease.  Study limited by moderate background enhancement.\par Nonspecific prominent left axillary lymph nodes which may be reactive in the setting of recent Covid vaccination.  Left axillary ultrasound evaluation and sampling of the most suspicious lymph node can be performed if requested.\par RECOMMENDATION:  Surgical or oncologic management. BI-RADS 6- Known Biopsy-Proven Malignancy\par \par 2021 US Axilla Only, Left             \par FINDINGS: No suspicious axillary lymph nodes are noted. A nonspecific benign-appearing 1.9 x 0.6 x 1.2 cm axillary lymph node is identified.\par IMPRESSION:No sonographically suspicious axillary lymph nodes.\par Surgical management of the known left breast cancer remains recommended.\par RECOMMENDATION: Surgical or oncologic management.BI-RADS 2 - Benign Finding(s)\par \par 21 Underwent left breast savita  wide lumpectomy with sentinel node biopsy.\par Surgical Final Report\par Final Diagnosis\par 1     Left sentinel lymph node #2 blue:\par - One lymph node, negative for metastatic carcinoma (0/1).\par 2     Left sentinel lymph node #1:\par - Two lymph node, negative for metastatic carcinoma (0/2).\par 3     Left breast savita wide lumpectomy:\par -        Invasive poorly differentiated mammary ductal carcinoma\par with apocrine features, [place]onson score 8/9 (3 + 3 + 2),\par adjacent to previous biopsy cavity.\par -       The  invasive tumor measures measuring 1.3 cm, one focus.\par -       Ductal carcinoma in situ, cribriform, low to\par intermediate nuclear grade, without calcifications or necrosis.\par -       All surgical resection margins are negative, the\par tumor is located 0.15 cm from anterior aspect of the resection,\par the closest margin; see also the the following resection\par margins(4-9).\par 4     Left breast deep posterior margin:\par - Breast tissue, negative for dysplasia/neoplasia.\par 5     Left breast anterior margin:\par - Breast tissue, negative for dysplasia/neoplasia.\par 6     Left breast lateral margin:\par - Breast tissue, negative for dysplasia/neoplasia.\par 7     Left breast superior margin:\par - Breast tissue, negative for dysplasia/neoplasia. Minute\par focus of radial scar, completely excised.\par 8     Left breast inferior margin;\par - Breast tissue, negative for dysplasia/neoplasia.\par 9     Left breast medial margin;\par - Breast tissue, negative for dysplasia/neoplasia.\par \par Final Diagnosis\par Left breast, Lumpectomy ( one slide) 1 slide, [id number], HE3E)\par - Invasive poorly differentiated ductal carcinoma with apocrine\par features ( see comment for further details)\par - As per report tumor is triple negative ( ER, TX and Her-2\par Negative)\par Comment :\par Only one slide is provided for review. Margin of the specimen in\par this one slide is negative for carcinoma. 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 Genetic Result: Negative: No clinically significant mutation identified \par PMH: Osteoporosis, Hyperlipidemia \par SH: Bilateral tubal ligation, right ear surgical procedure () \par FH: Mother: stomach cancer; twin sister: esophageal cancer,   [de-identified] : Returns for follow up visit. Completed RT on 12/01/2021.\par \par On slow release iron every other day \par Hair loss stopped in 1/2022, hair is noticeably growing back. Followed up with dermatology Dr. Aranda, was told hair loss is related to chemo \par Denies headache, dizziness, dyspnea \par Good appetite \par No weight loss \par Denies abdominal pain, nausea, vomiting \par Denies pain \par Walking 3 miles a day, staying active \par Feels overall well

## 2022-05-12 NOTE — PHYSICAL EXAM
[Normal] : normal appearance, no rash, nodules, vesicles, ulcers, erythema [de-identified] : No bilateral breast mass. No bilateral axilla lymphadenopathy.

## 2022-05-12 NOTE — ASSESSMENT
[FreeTextEntry1] : 70 y/o postmenopausal female with stage IA triple negative breast cancer, pT1cN0. Underwent left breast savita  wide lumpectomy on 06/28/21. \par 7/28/21 medi port placed\par Completed 4 cycles of Taxotere / Cytoxan on 10/8/21. \par Completed adjuvant RT with Dr. Martin Conti of [facility] on 12/1/21. \par \par Overall feeling well. Notes hair loss has stopped.\par 05/02/2022 Mammo/sono- Impression: New posttreatment changes in the left breast.Post surgical changes and nonspecific lymph nodes with fatty kj in the left axilla. BI-RADS 3\par \par CBC from today WBC 7.7 K HGB 13.3 g HCT 39.7 %  K \par \par Plan:\par Hair loss stopped, likely secondary to chemotherapy, though she had used dignicap during chemotherapy, this occurred primary after stopping chemotherapy.  Continue f/u with dermatology. Continue slowFe every other day, iron panel ordered. \par Abnormal Mammo/Sono 5/2/22: advised to follow up with Dr. Sunita peters to review mammo/sono today for further intervention \par Port flush due today- labs with port flush \par Advised to call with concerns/symptoms\par Follow up in 2 months with Dr Hairston or sooner if needed depending on surg onc plan

## 2022-05-12 NOTE — CONSULT LETTER
[Dear  ___] : Dear  [unfilled], [Consult Letter:] : I had the pleasure of evaluating your patient, [unfilled]. [Please see my note below.] : Please see my note below. [Consult Closing:] : Thank you very much for allowing me to participate in the care of this patient.  If you have any questions, please do not hesitate to contact me. [Sincerely,] : Sincerely, [DrRosario  ___] : Dr. AYALA [FreeTextEntry3] : Yulia Hairston MD\par Medical Oncology/Hematology\par [facility] [place], [facility]\par \par \par [facility] at [facility]\par

## 2022-05-17 ENCOUNTER — APPOINTMENT (OUTPATIENT)
Dept: DERMATOLOGY | Facility: CLINIC | Age: 72
End: 2022-05-17
Payer: MEDICARE

## 2022-05-17 PROCEDURE — 99213 OFFICE O/P EST LOW 20 MIN: CPT

## 2022-05-18 ENCOUNTER — OUTPATIENT (OUTPATIENT)
Dept: OUTPATIENT SERVICES | Facility: HOSPITAL | Age: 72
LOS: 1 days | End: 2022-05-18

## 2022-05-18 ENCOUNTER — APPOINTMENT (OUTPATIENT)
Dept: INTERVENTIONAL RADIOLOGY/VASCULAR | Facility: CLINIC | Age: 72
End: 2022-05-18
Payer: MEDICARE

## 2022-05-18 VITALS
OXYGEN SATURATION: 98 % | HEART RATE: 77 BPM | SYSTOLIC BLOOD PRESSURE: 130 MMHG | TEMPERATURE: 97.8 F | DIASTOLIC BLOOD PRESSURE: 75 MMHG | RESPIRATION RATE: 16 BRPM

## 2022-05-18 DIAGNOSIS — C50.312 MALIGNANT NEOPLASM OF LOWER-INNER QUADRANT OF LEFT FEMALE BREAST: ICD-10-CM

## 2022-05-18 DIAGNOSIS — Z98.890 OTHER SPECIFIED POSTPROCEDURAL STATES: Chronic | ICD-10-CM

## 2022-05-18 PROCEDURE — 36590 REMOVAL TUNNELED CV CATH: CPT

## 2022-05-18 RX ORDER — ZOLPIDEM TARTRATE 5 MG/1
5 TABLET ORAL
Qty: 30 | Refills: 0 | Status: DISCONTINUED | COMMUNITY
Start: 2021-08-11 | End: 2022-05-18

## 2022-05-18 RX ORDER — TRIAMCINOLONE ACETONIDE 0.25 MG/G
0.03 CREAM TOPICAL
Qty: 15 | Refills: 0 | Status: DISCONTINUED | COMMUNITY
Start: 2021-02-15 | End: 2022-05-18

## 2022-05-18 RX ORDER — PROCHLORPERAZINE MALEATE 10 MG/1
10 TABLET ORAL EVERY 6 HOURS
Qty: 90 | Refills: 0 | Status: DISCONTINUED | COMMUNITY
Start: 2021-07-29 | End: 2022-05-18

## 2022-05-18 RX ORDER — TOBRAMYCIN AND DEXAMETHASONE 3; 1 MG/ML; MG/ML
0.3-0.1 SUSPENSION/ DROPS OPHTHALMIC
Qty: 10 | Refills: 0 | Status: DISCONTINUED | COMMUNITY
Start: 2020-11-19 | End: 2022-05-18

## 2022-05-18 RX ORDER — ONDANSETRON 8 MG/1
8 TABLET ORAL EVERY 8 HOURS
Qty: 90 | Refills: 0 | Status: DISCONTINUED | COMMUNITY
Start: 2021-07-29 | End: 2022-05-18

## 2022-05-18 RX ORDER — TRAMADOL HYDROCHLORIDE 50 MG/1
50 TABLET, COATED ORAL
Qty: 30 | Refills: 0 | Status: DISCONTINUED | COMMUNITY
Start: 2021-08-10 | End: 2022-05-18

## 2022-05-18 RX ORDER — GABAPENTIN 100 MG/1
100 CAPSULE ORAL 3 TIMES DAILY
Qty: 90 | Refills: 1 | Status: DISCONTINUED | COMMUNITY
Start: 2021-07-13 | End: 2022-05-18

## 2022-05-18 RX ORDER — GABAPENTIN 300 MG/1
300 CAPSULE ORAL 3 TIMES DAILY
Qty: 90 | Refills: 2 | Status: DISCONTINUED | COMMUNITY
Start: 2021-07-13 | End: 2022-05-18

## 2022-05-18 RX ORDER — TRIAMCINOLONE ACETONIDE 0.5 MG/G
0.05 OINTMENT TOPICAL TWICE DAILY
Qty: 1 | Refills: 2 | Status: DISCONTINUED | COMMUNITY
Start: 2022-03-07 | End: 2022-05-18

## 2022-06-01 NOTE — HISTORY OF PRESENT ILLNESS
[FreeTextEntry1] : PRE CALL PRIOR TO APPOINTMENT:  \par \par  Phone Number: [phone]\par \par  COVID-19 SWAB:  Advised to obtain\par \par  RX on file:  yes\par \par  Referring MD:  Yulia Hairston\par \par  Appointment date:  5/18/22\par \par  Currently on Chemotherapy: No             \par \par  Diabetic:  No\par \par  Clotting or Bleeding disorders:  No\par \par  PPM / Defibrillator:  No\par \par  NPO status advised:  Yes\par \par  History of fall:  No   \par \par  Assistant device for walking: no  \par \par   home:   Bill\par \par  Blood Thinners: No \par \par  Prescribing MD agree to have medication held:  NA\par \par  Capacity to make decisions: Yes\par \par  HCP:   Bill\par \par  DNR:  Yes\par \par  Person contact for Pre-Call:  Patient\par \par  \par \par  Guidelines for Screening Anesthesia / Sedation Cases	(TYPE YES OR NO)  \par \par   Obtain Prescription / Authorization from Referring Physician: YES	 \par \par Medical Necessity (Justification of the need for Anesthesia / Sedation) from referring Physician: YES	 \par \par  Have you taken oral sedation? (e.g. Xanax, Valium, and other oral sedatives):  NO	 \par \par  Clearance to receive Anesthesia / Sedation: YES- BY Dr. Flood 	 \par \par  \par \par  ANESTHESIA EXCLUSION CRITERIA QUESTIONNAIRE:	 \par \par Difficult Airway: (TYPE YES OR NO) 	 \par \par Previous Problem with Anesthesia or sedation: NO	 \par \par  History of Difficult IntubatioN: NO	 \par \par  Stridor, Snoring, Sleep Apnea: NO	 \par \par  Tonsils / Adenoids present: YES	 \par \par  Dysmorphic Facial Features: NO	 \par \par  Cervical Spine Fusion/ Cervical Spine Surgery: NO	 \par \par  Tumor in Airway: NO	 \par \par  Trauma to Airway: NO	 \par \par  Radiation therapy to head / neck: NO	 \par \par  Advanced Rheumatoid ArthritiS: NO	 \par \par  Active Cardiac Ischemia (as defined by EKG or Laboratory  Examination): NO	 \par \par  Severe COPD / Home O2: NO	 \par \par  Known or Suspected Increased Intracranial pressure: NO	 \par \par  Patient with BMI of 40 or greater : NO    Weight (kgs.) _60_ Height (ft.) _5'2"_       BMI__24.1___	 	 \par \par  Patients with Increased Risk of Aspiration: (TYPE YES OR NO) 		 \par \par  Abnormal Airway: NO	 \par \par  Hiatal Hernia with Reflux: NO	 \par \par  Pregnancy: NO	 	 \par \par  Altered Mental State: NO 	 	 \par \par  Spinal Cord Injury with Paraplegia or Quadriplegia: NO	 	 \par \par  History of delayed Gastric Emptying: NO 	 	 \par \par  ASA Physical Status of 3 or greater (See Appendix 1 from policy ANSL.5502) 	 	 \par \par \par Malignant Hyperthermia Screening: (TYPE YES OR NO) 	 \par \par Family history unexpected death following anesthesia: NO	 \par \par  Family or personal history of Malignant Hyperthermia: NO  	 \par \par   A muscle or neuromuscular disorder: NO 	 \par \par  \par \par  Angélica -Operative Assessment ( Day of Procedure)  \par  \par  Procedure:  Mediport Removal\par \par  NPO status: 9pm\par \par  Accompanied by:   Bill\par \par  Falls risk:  No\par \par  Labs: PLT: 248      INR:      PT:      CR: 0.7     Potassium:  4.8\par \par  IVL:  20g hl placed to right hand by KK\par \par  IR MD: Dr. Gabriela Aguilar  \par \par  Urine Pregnancy: NA\par \par  Pre-Op instructions: Given\par \par  POST-OP teaching initiated:  Yes\par \par  Allergy bracelet on: NKA\par \par  Anesthesia plan discussed with IR MD:  Yes\par \par  Antibiotic given: n/a\par

## 2022-06-01 NOTE — REASON FOR VISIT
[Procedure: _________] : a [unfilled] procedure visit [FreeTextEntry1] : Diagnosis of malignant neoplasm left breast s/p treatment

## 2022-06-08 ENCOUNTER — OUTPATIENT (OUTPATIENT)
Dept: OUTPATIENT SERVICES | Facility: HOSPITAL | Age: 72
LOS: 1 days | Discharge: HOME | End: 2022-06-08

## 2022-06-08 DIAGNOSIS — Z98.890 OTHER SPECIFIED POSTPROCEDURAL STATES: Chronic | ICD-10-CM

## 2022-06-08 DIAGNOSIS — C50.919 MALIGNANT NEOPLASM OF UNSPECIFIED SITE OF UNSPECIFIED FEMALE BREAST: ICD-10-CM

## 2022-06-09 ENCOUNTER — APPOINTMENT (OUTPATIENT)
Dept: SURGERY | Facility: CLINIC | Age: 72
End: 2022-06-09
Payer: MEDICARE

## 2022-06-09 ENCOUNTER — RESULT REVIEW (OUTPATIENT)
Age: 72
End: 2022-06-09

## 2022-06-09 VITALS
BODY MASS INDEX: 24.29 KG/M2 | SYSTOLIC BLOOD PRESSURE: 150 MMHG | HEART RATE: 54 BPM | HEIGHT: 62 IN | WEIGHT: 132 LBS | TEMPERATURE: 98 F | OXYGEN SATURATION: 98 % | DIASTOLIC BLOOD PRESSURE: 80 MMHG

## 2022-06-09 DIAGNOSIS — Z85.3 PERSONAL HISTORY OF MALIGNANT NEOPLASM OF BREAST: ICD-10-CM

## 2022-06-09 PROCEDURE — 99214 OFFICE O/P EST MOD 30 MIN: CPT

## 2022-06-09 NOTE — ASU PATIENT PROFILE, ADULT - NS PRO LACT YNNA
no
You can access the FollowMyHealth Patient Portal offered by [facility] by registering at the following website: [email or web address]. By joining [facility]’s FollowMyHealth portal, you will also be able to view your health information using other applications (apps) compatible with our system.

## 2022-06-15 ENCOUNTER — APPOINTMENT (OUTPATIENT)
Age: 72
End: 2022-06-15

## 2022-06-21 PROBLEM — Z85.3 HISTORY OF LEFT BREAST CANCER: Status: ACTIVE | Noted: 2022-06-21

## 2022-06-21 NOTE — HISTORY OF PRESENT ILLNESS
[FreeTextEntry1] : I had the pleasure of seeing KRISTIE COATES  in the office today for a followup visit. She has a history of left breast cancer.\par \par Kristie was diagnosed and treated with left breast poorly differentiated triple negative breast cancer at age 70. She underwent left breast conservation with localized lumpectomy and SLNB 6/21/2021. She completed chemotherapy for her TNBC with Dr Hairston and then underwent radiation therapy with Dr Longoria at [facility].  She completed radiation on 12/1/2021.\par \par She has healed well from surgery. She denies dominant breast mass, skin changes or nipple discharge. She denies headaches, blurry vision, SOB, chest pain, abdominal pain, joint aches or difficulty walking. \par \par Imaging\par 5/2/22 bilat mammo lizabeth-impression new post treatment changes in the left breast. post surgica change and nonspecific lymph nodes with fatty kj in the left axilla. correlate with surgical pathology i the absence of axillary metastatic disease, recommend follow up left mammo and left axillary US in 6 months.\par \par \par Pathology:\par Final pathology demonstrated 1.3cm IDC grade with DCIS 3 triple negative with clear margins (after assessing additional margins) and 0/3 sentinel nodes. \par  Left sentinel node 1. negative for carcinoma\par Left sentinel node 2 (0/2) negative for carcinoma\par Left breast savita wide lumpectomy: Invasive poorly differentiated carcinoma with DCIS. The invasive tumor measures 1.3cm one focus. All surgical resection margins are negative. the tumor is located 0.15 cm from anterior aspect of the resection, the closest margin; ER negative MS negative [id number] negative\par Anterior margin: negative\par Posterior deep margin: negative\par Lateral margin: negative\par Medial margin: negative\par Superior margin: negative\par Inferior margin: negative\par \par We reviewed clinical breast exam and clinical breast exam is benign.  She has mild hyperpigmentation from radiation therapy.  She will undergo diagnostic mammogram and sonogram in November 2022.\par All questions were answered.

## 2022-06-21 NOTE — ASSESSMENT
[FreeTextEntry1] : 72 yo postmenopausal female with a history of Stage 1 triple negative left breast cancer, underwent breast conservation surgery with lumpectomy and SLNB on 6/21/2021 0/3 nodes.  She completed chemotherapy and radiation therapy.  Recommendation for mammogram/sono followup in 6 months.\par 1. Follow up with radiation oncology\par 2. Clinical examination in 6 months\par 3. Bilateral diagnostic mammogram/ultrasound 11/2022\par 4.  Follow up with medical oncology\par

## 2022-06-21 NOTE — PHYSICAL EXAM
[Breast Nipple Inversion] : nipples not inverted [Breast Nipple Retraction] : nipples not retracted [Breast Nipple Flattening] : nipples not flattened [Breast Nipple Fissures] : nipples not fissured [Breast Abnormal Lactation (Galactorrhea)] : no galactorrhea [Breast Abnormal Secretion Bloody Fluid] : no bloody discharge [Breast Abnormal Secretion Serous Fluid] : no serous discharge [Breast Abnormal Secretion Opalescent Fluid] : no milky discharge [de-identified] : No supraclavicular or axillary adenopathy. No dominant masses, normal to palpation. Everted nipple without discharge. No skin changes.\par \par  [de-identified] : Mild hyperpigmentation from radiation therapy. Well healed surgical incision. Everted nipple without discharge. No dominant mass.

## 2022-07-27 ENCOUNTER — APPOINTMENT (OUTPATIENT)
Age: 72
End: 2022-07-27

## 2022-08-10 ENCOUNTER — OUTPATIENT (OUTPATIENT)
Dept: OUTPATIENT SERVICES | Facility: HOSPITAL | Age: 72
LOS: 1 days | Discharge: HOME | End: 2022-08-10

## 2022-08-10 DIAGNOSIS — D50.9 IRON DEFICIENCY ANEMIA, UNSPECIFIED: ICD-10-CM

## 2022-08-10 DIAGNOSIS — Z98.890 OTHER SPECIFIED POSTPROCEDURAL STATES: Chronic | ICD-10-CM

## 2022-08-10 DIAGNOSIS — C50.919 MALIGNANT NEOPLASM OF UNSPECIFIED SITE OF UNSPECIFIED FEMALE BREAST: ICD-10-CM

## 2022-08-16 ENCOUNTER — APPOINTMENT (OUTPATIENT)
Dept: HEMATOLOGY ONCOLOGY | Facility: CLINIC | Age: 72
End: 2022-08-16

## 2022-08-16 ENCOUNTER — RESULT REVIEW (OUTPATIENT)
Age: 72
End: 2022-08-16

## 2022-08-16 ENCOUNTER — LABORATORY RESULT (OUTPATIENT)
Age: 72
End: 2022-08-16

## 2022-08-16 VITALS
WEIGHT: 134 LBS | HEIGHT: 62 IN | OXYGEN SATURATION: 99 % | SYSTOLIC BLOOD PRESSURE: 119 MMHG | DIASTOLIC BLOOD PRESSURE: 77 MMHG | HEART RATE: 83 BPM | BODY MASS INDEX: 24.66 KG/M2

## 2022-08-16 LAB
BASOPHILS # BLD AUTO: 0.1 K/UL — SIGNIFICANT CHANGE UP (ref 0–0.2)
BASOPHILS NFR BLD AUTO: 1.7 % — SIGNIFICANT CHANGE UP (ref 0–2)
EOSINOPHIL # BLD AUTO: 0.3 K/UL — SIGNIFICANT CHANGE UP (ref 0–0.5)
EOSINOPHIL NFR BLD AUTO: 4 % — SIGNIFICANT CHANGE UP (ref 0–6)
HCT VFR BLD CALC: 43.6 % — SIGNIFICANT CHANGE UP (ref 34.5–45)
HGB BLD-MCNC: 14 G/DL — SIGNIFICANT CHANGE UP (ref 11.5–15.5)
LYMPHOCYTES # BLD AUTO: 1.7 K/UL — SIGNIFICANT CHANGE UP (ref 1–3.3)
LYMPHOCYTES # BLD AUTO: 22 % — SIGNIFICANT CHANGE UP (ref 13–44)
MCHC RBC-ENTMCNC: 32.1 G/DL — SIGNIFICANT CHANGE UP (ref 32–36)
MCHC RBC-ENTMCNC: 32.1 PG — SIGNIFICANT CHANGE UP (ref 27–34)
MCV RBC AUTO: 100 FL — SIGNIFICANT CHANGE UP (ref 80–100)
MONOCYTES # BLD AUTO: 0.5 K/UL — SIGNIFICANT CHANGE UP (ref 0–0.9)
MONOCYTES NFR BLD AUTO: 6.9 % — SIGNIFICANT CHANGE UP (ref 2–14)
NEUTROPHILS # BLD AUTO: 5.1 K/UL — SIGNIFICANT CHANGE UP (ref 1.8–7.4)
NEUTROPHILS NFR BLD AUTO: 65.4 % — SIGNIFICANT CHANGE UP (ref 43–77)
PLATELET # BLD AUTO: 254 K/UL — SIGNIFICANT CHANGE UP (ref 150–400)
RBC # BLD: 4.37 M/UL — SIGNIFICANT CHANGE UP (ref 3.8–5.2)
RBC # FLD: 11.9 % — SIGNIFICANT CHANGE UP (ref 10.3–14.5)
WBC # BLD: 7.9 K/UL — SIGNIFICANT CHANGE UP (ref 3.8–10.5)
WBC # FLD AUTO: 7.9 K/UL — SIGNIFICANT CHANGE UP (ref 3.8–10.5)

## 2022-08-16 PROCEDURE — 99214 OFFICE O/P EST MOD 30 MIN: CPT

## 2022-08-16 NOTE — CONSULT LETTER
[Dear  ___] : Dear  [unfilled], [Consult Letter:] : I had the pleasure of evaluating your patient, [unfilled]. [Please see my note below.] : Please see my note below. [Consult Closing:] : Thank you very much for allowing me to participate in the care of this patient.  If you have any questions, please do not hesitate to contact me. [Sincerely,] : Sincerely, [DrRosario  ___] : Dr. AYALA [FreeTextEntry3] : Yulia Hairston MD\par Medical Oncology/Hematology\par [facility] [place], [facility]\par \par \par [facility] at [facility]\par

## 2022-08-16 NOTE — HISTORY OF PRESENT ILLNESS
[de-identified] : Ms. Gruber is a 70 year old female newly diagnosed with triple negative left breast cancer at age 70. She recently underwent bilateral screening mammogram 3/18/2021 which demonstrated a new mammographic mass at the 9:00 position of the left breast which was biopsied on 2021 and demonstrated poorly differentiated invasive carcinoma ER- AZ- [id number] -\par \par 2021 Bilateral screening mammogram \par Left breast nodule\par Left breast sonography is recommended: BI-RADS-0: Incomplete: Needs Additional Imaging evaluation\par \par 2021 DEXA \par Osteopetrosis category with the lowest T score -2/9 within the lumbar spine\par \par 2021 Breast ultrasound Left Complete\par 9:00 5cm from the nipple, corresponding to the new mammographic mass there is a hypoechoic mass with irregular borders measuring 9 X 8 X 2mm cyst.\par Ultrasound- guided core biopsy is recommended\par There is a left axillary lymph node questionable mildly thickened cortex. Patient recently had a COVID vaccine and this may be reactive \par BI-RADS-4: Suspicious Findings 4-C high suspicion for malignancy \par \par 2021 Underwent Breast, left, 9 o'clock 5 cm FN, core biopsy\par - Invasive poorly differentiated ductal carcinoma\par - Farhad score 8/9 (3 + 3 + 2) in this limited material\par - Invasive tumor measures at least 0.7 cm\par - Ductal carcinoma in situ absent\par - Microcalcifications absent\par - Lymphovascular permeation by tumor not seen\par - As per outside pathology report:\par ER:  Negative 0% nuclear staining\par AZ:  Negative 0% nuclear staining\par Her-2:  Negative (0 membrane staining)\par \par 2021 MR Breast w/wo IV Cont, Bilateral w/CAD \par 1.0 cm malignancy in the central left breast.\par No MRI evidence of multicentric or contralateral disease.  Study limited by moderate background enhancement.\par Nonspecific prominent left axillary lymph nodes which may be reactive in the setting of recent Covid vaccination.  Left axillary ultrasound evaluation and sampling of the most suspicious lymph node can be performed if requested.\par RECOMMENDATION:  Surgical or oncologic management. BI-RADS 6- Known Biopsy-Proven Malignancy\par \par 2021 US Axilla Only, Left             \par FINDINGS: No suspicious axillary lymph nodes are noted. A nonspecific benign-appearing 1.9 x 0.6 x 1.2 cm axillary lymph node is identified.\par IMPRESSION:No sonographically suspicious axillary lymph nodes.\par Surgical management of the known left breast cancer remains recommended.\par RECOMMENDATION: Surgical or oncologic management.BI-RADS 2 - Benign Finding(s)\par \par 21 Underwent left breast savita  wide lumpectomy with sentinel node biopsy.\par Surgical Final Report\par Final Diagnosis\par 1     Left sentinel lymph node #2 blue:\par - One lymph node, negative for metastatic carcinoma (0/1).\par 2     Left sentinel lymph node #1:\par - Two lymph node, negative for metastatic carcinoma (0/2).\par 3     Left breast savita wide lumpectomy:\par -        Invasive poorly differentiated mammary ductal carcinoma\par with apocrine features, [place] score 8/9 (3 + 3 + 2),\par adjacent to previous biopsy cavity.\par -       The  invasive tumor measures measuring 1.3 cm, one focus.\par -       Ductal carcinoma in situ, cribriform, low to\par intermediate nuclear grade, without calcifications or necrosis.\par -       All surgical resection margins are negative, the\par tumor is located 0.15 cm from anterior aspect of the resection,\par the closest margin; see also the the following resection\par margins(4-9).\par 4     Left breast deep posterior margin:\par - Breast tissue, negative for dysplasia/neoplasia.\par 5     Left breast anterior margin:\par - Breast tissue, negative for dysplasia/neoplasia.\par 6     Left breast lateral margin:\par - Breast tissue, negative for dysplasia/neoplasia.\par 7     Left breast superior margin:\par - Breast tissue, negative for dysplasia/neoplasia. Minute\par focus of radial scar, completely excised.\par 8     Left breast inferior margin;\par - Breast tissue, negative for dysplasia/neoplasia.\par 9     Left breast medial margin;\par - Breast tissue, negative for dysplasia/neoplasia.\par \par Final Diagnosis\par Left breast, Lumpectomy ( one slide) 1 slide, [id number], HE3E)\par - Invasive poorly differentiated ductal carcinoma with apocrine\par features ( see comment for further details)\par - As per report tumor is triple negative ( ER, AZ and Her-2\par Negative)\par Comment :\par Only one slide is provided for review. Margin of the specimen in\par this one slide is negative for carcinoma. 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 \par Previously was evaluated [facility] in [place], planning for RT in the future at that site due to vicinity to home \par Former smoker, retired\par \par 21 [facility] Genetic Result: Negative: No clinically significant mutation identified \par PMH: Osteoporosis, Hyperlipidemia \par SH: Bilateral tubal ligation, right ear surgical procedure () \par FH: Mother: stomach cancer; twin sister: esophageal cancer,   [de-identified] : Returns for follow up visit\par Reports neuropathy in breast \par States that she take iron pill inconsistently due to complaints constipation \par Reports previously had COVID and was asymptomatic \par States she is physically active and walks a lot\par On multivitamins, not currently taking Vit D

## 2022-08-16 NOTE — ADDENDUM
[FreeTextEntry1] : Documented by Sana Arroyo acting as scribe for Dr. Hairston on 08/16/2022.\par \par All Medical record entries made by the Scribe were at my, Dr. Hairston, direction and personally dictated by me on 08/16/2022. I have reviewed the chart and agree that the record accurately reflects my personal performance of the history, physical exam, assessment and plan. I have also personally directed, reviewed, and agreed with the discharge instructions.

## 2022-08-16 NOTE — ASSESSMENT
[FreeTextEntry1] : 70 y/o postmenopausal female with stage IA triple negative breast cancer, pT1cN0. Underwent left breast savita  wide lumpectomy on 06/28/21. \par 7/28/21 medi port placed\par Completed 4 cycles of Taxotere / Cytoxan on 10/8/21. \par Completed adjuvant RT with Dr. Martin Conti of [facility] on 12/1/21. \par \par Reviewed 3/18/21 bone density - osteoporosis\par 05/02/2022 Mammo/sono- Impression: New posttreatment changes in the left breast.Post surgical changes and nonspecific lymph nodes with fatty kj in the left axilla. BI-RADS 3\par CBC from today WBC 7.7 K HGB 13.3 g HCT 39.7 %  K \par \par Overall doing well, no new symptoms. \par \par \par Plan:\par Follow up mammo/sono scheduled for 11/2022\par Osteoporosis - vitamin D 25-H level today, advised to follow up with PCP re: treatment for osteoporosis\par Port flush- last in 5/4/2022\par Follow up in 4 months

## 2022-08-17 LAB
ALBUMIN SERPL ELPH-MCNC: 4.5 G/DL
ALP BLD-CCNC: 87 U/L
ALT SERPL-CCNC: 18 U/L
ANION GAP SERPL CALC-SCNC: 12 MMOL/L
AST SERPL-CCNC: 16 U/L
BILIRUB SERPL-MCNC: 0.3 MG/DL
BUN SERPL-MCNC: 13 MG/DL
CALCIUM SERPL-MCNC: 9.9 MG/DL
CHLORIDE SERPL-SCNC: 104 MMOL/L
CO2 SERPL-SCNC: 27 MMOL/L
CREAT SERPL-MCNC: 0.81 MG/DL
EGFR: 78 ML/MIN/1.73M2
GLUCOSE SERPL-MCNC: 117 MG/DL
POTASSIUM SERPL-SCNC: 5.2 MMOL/L
PROT SERPL-MCNC: 6.5 G/DL
SODIUM SERPL-SCNC: 144 MMOL/L

## 2022-10-04 ENCOUNTER — APPOINTMENT (OUTPATIENT)
Dept: SURGERY | Facility: CLINIC | Age: 72
End: 2022-10-04

## 2022-11-11 NOTE — END OF VISIT
I discussed case w card directly.  psa/thrill present prior to R fem V access, suggesting chronic psa/avf.  Therefore can continue to monitor as outpt.  Pt has no pain, no leg swelling.  He is also not  medically optimized for psa repair.  It is not amenable to percutaneous injection, as there would be high risk for embolization.    Plan close outpt surveillance [Time Spent: ___ minutes] : I have spent [unfilled] minutes of time on the encounter.

## 2022-11-30 ENCOUNTER — RESULT REVIEW (OUTPATIENT)
Age: 72
End: 2022-11-30

## 2022-11-30 ENCOUNTER — APPOINTMENT (OUTPATIENT)
Dept: MAMMOGRAPHY | Facility: CLINIC | Age: 72
End: 2022-11-30

## 2022-11-30 ENCOUNTER — APPOINTMENT (OUTPATIENT)
Dept: ULTRASOUND IMAGING | Facility: CLINIC | Age: 72
End: 2022-11-30

## 2022-11-30 ENCOUNTER — OUTPATIENT (OUTPATIENT)
Dept: OUTPATIENT SERVICES | Facility: HOSPITAL | Age: 72
LOS: 1 days | End: 2022-11-30
Payer: MEDICARE

## 2022-11-30 DIAGNOSIS — Z98.890 OTHER SPECIFIED POSTPROCEDURAL STATES: Chronic | ICD-10-CM

## 2022-11-30 DIAGNOSIS — Z85.3 PERSONAL HISTORY OF MALIGNANT NEOPLASM OF BREAST: ICD-10-CM

## 2022-11-30 PROCEDURE — 76642 ULTRASOUND BREAST LIMITED: CPT

## 2022-11-30 PROCEDURE — 77065 DX MAMMO INCL CAD UNI: CPT | Mod: 26,LT

## 2022-11-30 PROCEDURE — G0279: CPT | Mod: 26

## 2022-11-30 PROCEDURE — 77065 DX MAMMO INCL CAD UNI: CPT

## 2022-11-30 PROCEDURE — 76642 ULTRASOUND BREAST LIMITED: CPT | Mod: 26,LT

## 2022-11-30 PROCEDURE — G0279: CPT

## 2022-12-02 ENCOUNTER — OUTPATIENT (OUTPATIENT)
Dept: OUTPATIENT SERVICES | Facility: HOSPITAL | Age: 72
LOS: 1 days | Discharge: HOME | End: 2022-12-02

## 2022-12-02 DIAGNOSIS — Z98.890 OTHER SPECIFIED POSTPROCEDURAL STATES: Chronic | ICD-10-CM

## 2022-12-02 DIAGNOSIS — C50.919 MALIGNANT NEOPLASM OF UNSPECIFIED SITE OF UNSPECIFIED FEMALE BREAST: ICD-10-CM

## 2022-12-08 ENCOUNTER — RESULT REVIEW (OUTPATIENT)
Age: 72
End: 2022-12-08

## 2022-12-08 ENCOUNTER — APPOINTMENT (OUTPATIENT)
Dept: SURGERY | Facility: CLINIC | Age: 72
End: 2022-12-08

## 2022-12-08 ENCOUNTER — APPOINTMENT (OUTPATIENT)
Dept: HEMATOLOGY ONCOLOGY | Facility: CLINIC | Age: 72
End: 2022-12-08

## 2022-12-08 VITALS
WEIGHT: 136.05 LBS | SYSTOLIC BLOOD PRESSURE: 129 MMHG | TEMPERATURE: 97.9 F | OXYGEN SATURATION: 98 % | BODY MASS INDEX: 24.88 KG/M2 | HEART RATE: 79 BPM | DIASTOLIC BLOOD PRESSURE: 80 MMHG

## 2022-12-08 DIAGNOSIS — C50.919 MALIGNANT NEOPLASM OF UNSPECIFIED SITE OF UNSPECIFIED FEMALE BREAST: ICD-10-CM

## 2022-12-08 LAB
BASOPHILS # BLD AUTO: 0.1 K/UL — SIGNIFICANT CHANGE UP (ref 0–0.2)
BASOPHILS NFR BLD AUTO: 1.7 % — SIGNIFICANT CHANGE UP (ref 0–2)
EOSINOPHIL # BLD AUTO: 0.3 K/UL — SIGNIFICANT CHANGE UP (ref 0–0.5)
EOSINOPHIL NFR BLD AUTO: 4.3 % — SIGNIFICANT CHANGE UP (ref 0–6)
HCT VFR BLD CALC: 37.5 % — SIGNIFICANT CHANGE UP (ref 34.5–45)
HGB BLD-MCNC: 12.5 G/DL — SIGNIFICANT CHANGE UP (ref 11.5–15.5)
LYMPHOCYTES # BLD AUTO: 1.8 K/UL — SIGNIFICANT CHANGE UP (ref 1–3.3)
LYMPHOCYTES # BLD AUTO: 23.9 % — SIGNIFICANT CHANGE UP (ref 13–44)
MCHC RBC-ENTMCNC: 32.7 PG — SIGNIFICANT CHANGE UP (ref 27–34)
MCHC RBC-ENTMCNC: 33.4 G/DL — SIGNIFICANT CHANGE UP (ref 32–36)
MCV RBC AUTO: 97.8 FL — SIGNIFICANT CHANGE UP (ref 80–100)
MONOCYTES # BLD AUTO: 0.6 K/UL — SIGNIFICANT CHANGE UP (ref 0–0.9)
MONOCYTES NFR BLD AUTO: 7.7 % — SIGNIFICANT CHANGE UP (ref 2–14)
NEUTROPHILS # BLD AUTO: 4.7 K/UL — SIGNIFICANT CHANGE UP (ref 1.8–7.4)
NEUTROPHILS NFR BLD AUTO: 62.5 % — SIGNIFICANT CHANGE UP (ref 43–77)
PLATELET # BLD AUTO: 267 K/UL — SIGNIFICANT CHANGE UP (ref 150–400)
RBC # BLD: 3.83 M/UL — SIGNIFICANT CHANGE UP (ref 3.8–5.2)
RBC # FLD: 11.5 % — SIGNIFICANT CHANGE UP (ref 10.3–14.5)
WBC # BLD: 7.5 K/UL — SIGNIFICANT CHANGE UP (ref 3.8–10.5)
WBC # FLD AUTO: 7.5 K/UL — SIGNIFICANT CHANGE UP (ref 3.8–10.5)

## 2022-12-08 PROCEDURE — 99214 OFFICE O/P EST MOD 30 MIN: CPT

## 2022-12-08 NOTE — CONSULT LETTER
[Dear  ___] : Dear  [unfilled], [Consult Letter:] : I had the pleasure of evaluating your patient, [unfilled]. [Please see my note below.] : Please see my note below. [Consult Closing:] : Thank you very much for allowing me to participate in the care of this patient.  If you have any questions, please do not hesitate to contact me. [Sincerely,] : Sincerely, [DrRosario  ___] : Dr. AYALA [FreeTextEntry3] : Yulia Hairston MD\par Medical Oncology/Hematology\par [facility] [place], [facility]\par \par \par [facility] at [facility]\par

## 2022-12-08 NOTE — PHYSICAL EXAM
[Normal] : normoactive bowel sounds, soft and nontender, no hepatosplenomegaly or masses appreciated [de-identified] : No palpable breast mass or axillary adenopathy bilaterally

## 2022-12-08 NOTE — HISTORY OF PRESENT ILLNESS
[de-identified] : Ms. Gruber is a 70 year old female newly diagnosed with triple negative left breast cancer at age 70. She recently underwent bilateral screening mammogram 3/18/2021 which demonstrated a new mammographic mass at the 9:00 position of the left breast which was biopsied on 2021 and demonstrated poorly differentiated invasive carcinoma ER- GA- [id number] -\par \par 2021 Bilateral screening mammogram \par Left breast nodule\par Left breast sonography is recommended: BI-RADS-0: Incomplete: Needs Additional Imaging evaluation\par \par 2021 DEXA \par Osteopetrosis category with the lowest T score -2/9 within the lumbar spine\par \par 2021 Breast ultrasound Left Complete\par 9:00 5cm from the nipple, corresponding to the new mammographic mass there is a hypoechoic mass with irregular borders measuring 9 X 8 X 2mm cyst.\par Ultrasound- guided core biopsy is recommended\par There is a left axillary lymph node questionable mildly thickened cortex. Patient recently had a COVID vaccine and this may be reactive \par BI-RADS-4: Suspicious Findings 4-C high suspicion for malignancy \par \par 2021 Underwent Breast, left, 9 o'clock 5 cm FN, core biopsy\par - Invasive poorly differentiated ductal carcinoma\par - Farhad score 8/9 (3 + 3 + 2) in this limited material\par - Invasive tumor measures at least 0.7 cm\par - Ductal carcinoma in situ absent\par - Microcalcifications absent\par - Lymphovascular permeation by tumor not seen\par - As per outside pathology report:\par ER:  Negative 0% nuclear staining\par GA:  Negative 0% nuclear staining\par Her-2:  Negative (0 membrane staining)\par \par 2021 MR Breast w/wo IV Cont, Bilateral w/CAD \par 1.0 cm malignancy in the central left breast.\par No MRI evidence of multicentric or contralateral disease.  Study limited by moderate background enhancement.\par Nonspecific prominent left axillary lymph nodes which may be reactive in the setting of recent Covid vaccination.  Left axillary ultrasound evaluation and sampling of the most suspicious lymph node can be performed if requested.\par RECOMMENDATION:  Surgical or oncologic management. BI-RADS 6- Known Biopsy-Proven Malignancy\par \par 2021 US Axilla Only, Left             \par FINDINGS: No suspicious axillary lymph nodes are noted. A nonspecific benign-appearing 1.9 x 0.6 x 1.2 cm axillary lymph node is identified.\par IMPRESSION:No sonographically suspicious axillary lymph nodes.\par Surgical management of the known left breast cancer remains recommended.\par RECOMMENDATION: Surgical or oncologic management.BI-RADS 2 - Benign Finding(s)\par \par 21 Underwent left breast savita  wide lumpectomy with sentinel node biopsy.\par Surgical Final Report\par Final Diagnosis\par 1     Left sentinel lymph node #2 blue:\par - One lymph node, negative for metastatic carcinoma (0/1).\par 2     Left sentinel lymph node #1:\par - Two lymph node, negative for metastatic carcinoma (0/2).\par 3     Left breast savita wide lumpectomy:\par -        Invasive poorly differentiated mammary ductal carcinoma\par with apocrine features, [place] score 8/9 (3 + 3 + 2),\par adjacent to previous biopsy cavity.\par -       The  invasive tumor measures measuring 1.3 cm, one focus.\par -       Ductal carcinoma in situ, cribriform, low to\par intermediate nuclear grade, without calcifications or necrosis.\par -       All surgical resection margins are negative, the\par tumor is located 0.15 cm from anterior aspect of the resection,\par the closest margin; see also the the following resection\par margins(4-9).\par 4     Left breast deep posterior margin:\par - Breast tissue, negative for dysplasia/neoplasia.\par 5     Left breast anterior margin:\par - Breast tissue, negative for dysplasia/neoplasia.\par 6     Left breast lateral margin:\par - Breast tissue, negative for dysplasia/neoplasia.\par 7     Left breast superior margin:\par - Breast tissue, negative for dysplasia/neoplasia. Minute\par focus of radial scar, completely excised.\par 8     Left breast inferior margin;\par - Breast tissue, negative for dysplasia/neoplasia.\par 9     Left breast medial margin;\par - Breast tissue, negative for dysplasia/neoplasia.\par \par Final Diagnosis\par Left breast, Lumpectomy ( one slide) 1 slide, [id number], HE3E)\par - Invasive poorly differentiated ductal carcinoma with apocrine\par features ( see comment for further details)\par - As per report tumor is triple negative ( ER, GA and Her-2\par Negative)\par Comment :\par Only one slide is provided for review. Margin of the specimen in\par this one slide is negative for carcinoma. 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 \par Previously [place] evaluated [facility] in [place], planning for RT in the future at that site due to vicinity to home \par Former smoker, retired\par \par 21 [facility] Genetic Result: Negative: No clinically significant mutation identified \par PMH: Osteoporosis, Hyperlipidemia \par SH: Bilateral tubal ligation, right ear surgical procedure () \par FH: Mother: stomach cancer; twin sister: esophageal cancer,   [de-identified] : Returns for follow up visit.\par \par S/p port removal on 5/18/22, denies complications\par Evaluated by breast surgeon Dr Dorsey 2 days ago, denies acute findings\par Followed up with rad onc [facility] in [place] on 11/2/22, denies acute findings\par Obtained routine flu vaccine recently \par Self discontinued oral iron in ~8/2022 due to side effect of constipation while taking oral iron, constipation resolved once iron was discontinued \par Started treamtment for osteoporosis with PCP, unable to recall name of oral drug \par On multivitamins, not currently taking Vit D \par Denies headache, dizziness, pain\par Denies dyspnea, cough\par Denies abdominal pain, nausea, vomiting, blood in stool, hematuria\par Notes hair grew back \par Feels well, remaining active

## 2022-12-08 NOTE — ASSESSMENT
[FreeTextEntry1] : 72 y/o postmenopausal female with stage IA triple negative breast cancer, pT1cN0. Underwent left breast savita  wide lumpectomy on 06/28/21. \par 7/28/21 medi port placed\par Completed 4 cycles of Taxotere / Cytoxan on 10/8/21. \par Completed adjuvant RT with Dr. Martin Conti of [facility] on 12/1/21. \par \par Overall doing well, no new symptoms. \par CBC from today is normal.\par S/p port removal on 5/18/22\par 11/30/2022 Left diagnostic mammo/sono- No suspicious mammographic or sonographic change on the left.\par \par Plan:\par Follow up with breast surgeon Dr. Dorsey \par Osteoporosis - on treatment with PCP\par Iron panel, B12/Folate ordered\par Continue preventative care \par Advised to call with concerns/symptoms \par Follow up in 4 months with Dr Hairston

## 2022-12-09 LAB
ALBUMIN SERPL ELPH-MCNC: 4.1 G/DL
ALP BLD-CCNC: 65 U/L
ALT SERPL-CCNC: 21 U/L
ANION GAP SERPL CALC-SCNC: 10 MMOL/L
AST SERPL-CCNC: 18 U/L
BILIRUB SERPL-MCNC: 0.2 MG/DL
BUN SERPL-MCNC: 16 MG/DL
CALCIUM SERPL-MCNC: 9.8 MG/DL
CHLORIDE SERPL-SCNC: 108 MMOL/L
CO2 SERPL-SCNC: 27 MMOL/L
CREAT SERPL-MCNC: 0.86 MG/DL
EGFR: 72 ML/MIN/1.73M2
FERRITIN SERPL-MCNC: 53 NG/ML
FOLATE SERPL-MCNC: >20 NG/ML
GLUCOSE SERPL-MCNC: 91 MG/DL
IRON SATN MFR SERPL: 25 %
IRON SERPL-MCNC: 86 UG/DL
POTASSIUM SERPL-SCNC: 5.5 MMOL/L
PROT SERPL-MCNC: 6.6 G/DL
SODIUM SERPL-SCNC: 144 MMOL/L
TIBC SERPL-MCNC: 338 UG/DL
UIBC SERPL-MCNC: 252 UG/DL
VIT B12 SERPL-MCNC: 667 PG/ML

## 2022-12-15 ENCOUNTER — OFFICE (OUTPATIENT)
Dept: URBAN - METROPOLITAN AREA CLINIC 38 | Facility: CLINIC | Age: 72
Setting detail: OPHTHALMOLOGY
End: 2022-12-15

## 2022-12-15 DIAGNOSIS — H90.6: ICD-10-CM

## 2022-12-15 PROCEDURE — 92593 HEARING AID CHECK; BINAURAL: CPT | Performed by: AUDIOLOGIST

## 2022-12-15 PROCEDURE — 92551 PURE TONE HEARING TEST AIR: CPT | Performed by: AUDIOLOGIST

## 2023-01-10 PROBLEM — H35.033 HYPERTENSIVE RETINOPATHY; BOTH: Status: ACTIVE | Noted: 2023-01-10

## 2023-02-24 ENCOUNTER — APPOINTMENT (OUTPATIENT)
Dept: DERMATOLOGY | Facility: CLINIC | Age: 73
End: 2023-02-24
Payer: MEDICARE

## 2023-02-24 PROCEDURE — 99214 OFFICE O/P EST MOD 30 MIN: CPT

## 2023-03-13 ENCOUNTER — APPOINTMENT (OUTPATIENT)
Dept: OBGYN | Facility: CLINIC | Age: 73
End: 2023-03-13
Payer: MEDICARE

## 2023-03-13 VITALS
BODY MASS INDEX: 25.4 KG/M2 | HEART RATE: 80 BPM | HEIGHT: 62 IN | WEIGHT: 138 LBS | DIASTOLIC BLOOD PRESSURE: 83 MMHG | SYSTOLIC BLOOD PRESSURE: 124 MMHG

## 2023-03-13 PROCEDURE — G0101: CPT

## 2023-03-13 PROCEDURE — 99397 PER PM REEVAL EST PAT 65+ YR: CPT | Mod: GY

## 2023-03-13 NOTE — HISTORY OF PRESENT ILLNESS
[FreeTextEntry1] : 73 yo here for av. She was dx with left breast cancer in March 2021, triple negative, BRCA1 and 2 negative. Had lumpectomy, chemo/radiation. Being followed by Dr. Dorsey.  Pt had + hpv, neg pap in 2020, last years pap had hpv negative, nml pap. \par She occ. has itching to vulva and uses triamcinolone cream .05% for relief-needs refill.\par \par She is c/o vaginal dryness,pain with sex to vagina [Patient reported bone density results were abnormal] : Patient reported bone density results were abnormal [BoneDensityDate] : 2021 [ColonoscopyDate] : 2/2023

## 2023-03-13 NOTE — PHYSICAL EXAM
[Chaperone Declined] : Patient declined chaperone [Appropriately responsive] : appropriately responsive [Alert] : alert [No Acute Distress] : no acute distress [No Lymphadenopathy] : no lymphadenopathy [Soft] : soft [Non-tender] : non-tender [Non-distended] : non-distended [No HSM] : No HSM [No Lesions] : no lesions [No Mass] : no mass [Oriented x3] : oriented x3 [Examination Of The Breasts] : a normal appearance [No Masses] : no breast masses were palpable [Labia Majora] : normal [Labia Minora] : normal [Atrophy] : atrophy [Normal] : normal [Uterine Adnexae] : normal [FreeTextEntry9] : deferred

## 2023-03-13 NOTE — PLAN
[FreeTextEntry1] : Well woman exam\par \par pap recommended with Hpv history, pt refused- has been through a lot with breast cancer, can't handle it\par bone density -utd, She has this ordered by pcp, she is on BONIVA now for osteoporisis.\par recommended taking vit. D 2000 units daily and through diet obtain 1200 mg of calcium along with 2.5 hours of weight bearing exercise per week\par return in 1 year\par triamcinolone .05% ointment refilled\par \par consider estrogen vag. cream, she will discuss with breast surgeon/fredi rec also

## 2023-04-03 RX ORDER — TRIAMCINOLONE ACETONIDE 0.5 MG/G
0.05 OINTMENT TOPICAL TWICE DAILY
Qty: 1 | Refills: 4 | Status: DISCONTINUED | COMMUNITY
Start: 2023-03-15 | End: 2023-04-03

## 2023-04-04 ENCOUNTER — OUTPATIENT (OUTPATIENT)
Dept: OUTPATIENT SERVICES | Facility: HOSPITAL | Age: 73
LOS: 1 days | Discharge: HOME | End: 2023-04-04

## 2023-04-04 ENCOUNTER — APPOINTMENT (OUTPATIENT)
Dept: DERMATOLOGY | Facility: CLINIC | Age: 73
End: 2023-04-04

## 2023-04-04 DIAGNOSIS — C50.919 MALIGNANT NEOPLASM OF UNSPECIFIED SITE OF UNSPECIFIED FEMALE BREAST: ICD-10-CM

## 2023-04-04 DIAGNOSIS — Z98.890 OTHER SPECIFIED POSTPROCEDURAL STATES: Chronic | ICD-10-CM

## 2023-04-04 DIAGNOSIS — D50.9 IRON DEFICIENCY ANEMIA, UNSPECIFIED: ICD-10-CM

## 2023-04-05 ENCOUNTER — RESULT REVIEW (OUTPATIENT)
Age: 73
End: 2023-04-05

## 2023-04-05 ENCOUNTER — APPOINTMENT (OUTPATIENT)
Dept: HEMATOLOGY ONCOLOGY | Facility: CLINIC | Age: 73
End: 2023-04-05
Payer: MEDICARE

## 2023-04-05 VITALS
BODY MASS INDEX: 24.84 KG/M2 | HEART RATE: 78 BPM | HEIGHT: 62 IN | WEIGHT: 135 LBS | TEMPERATURE: 97.7 F | SYSTOLIC BLOOD PRESSURE: 133 MMHG | DIASTOLIC BLOOD PRESSURE: 77 MMHG | OXYGEN SATURATION: 95 %

## 2023-04-05 LAB
BASOPHILS # BLD AUTO: 0.1 K/UL — SIGNIFICANT CHANGE UP (ref 0–0.2)
BASOPHILS NFR BLD AUTO: 1.8 % — SIGNIFICANT CHANGE UP (ref 0–2)
EOSINOPHIL # BLD AUTO: 0.3 K/UL — SIGNIFICANT CHANGE UP (ref 0–0.5)
EOSINOPHIL NFR BLD AUTO: 4 % — SIGNIFICANT CHANGE UP (ref 0–6)
HCT VFR BLD CALC: 40.2 % — SIGNIFICANT CHANGE UP (ref 34.5–45)
HGB BLD-MCNC: 13.1 G/DL — SIGNIFICANT CHANGE UP (ref 11.5–15.5)
LYMPHOCYTES # BLD AUTO: 1.9 K/UL — SIGNIFICANT CHANGE UP (ref 1–3.3)
LYMPHOCYTES # BLD AUTO: 24.2 % — SIGNIFICANT CHANGE UP (ref 13–44)
MCHC RBC-ENTMCNC: 32.1 PG — SIGNIFICANT CHANGE UP (ref 27–34)
MCHC RBC-ENTMCNC: 32.6 G/DL — SIGNIFICANT CHANGE UP (ref 32–36)
MCV RBC AUTO: 98.3 FL — SIGNIFICANT CHANGE UP (ref 80–100)
MONOCYTES # BLD AUTO: 0.6 K/UL — SIGNIFICANT CHANGE UP (ref 0–0.9)
MONOCYTES NFR BLD AUTO: 8 % — SIGNIFICANT CHANGE UP (ref 2–14)
NEUTROPHILS # BLD AUTO: 4.7 K/UL — SIGNIFICANT CHANGE UP (ref 1.8–7.4)
NEUTROPHILS NFR BLD AUTO: 61.9 % — SIGNIFICANT CHANGE UP (ref 43–77)
PLATELET # BLD AUTO: 274 K/UL — SIGNIFICANT CHANGE UP (ref 150–400)
RBC # BLD: 4.1 M/UL — SIGNIFICANT CHANGE UP (ref 3.8–5.2)
RBC # FLD: 11.6 % — SIGNIFICANT CHANGE UP (ref 10.3–14.5)
WBC # BLD: 7.6 K/UL — SIGNIFICANT CHANGE UP (ref 3.8–10.5)
WBC # FLD AUTO: 7.6 K/UL — SIGNIFICANT CHANGE UP (ref 3.8–10.5)

## 2023-04-05 PROCEDURE — 99214 OFFICE O/P EST MOD 30 MIN: CPT

## 2023-04-05 RX ORDER — METHYLDOPA 500 MG
160 (50 FE) TABLET ORAL
Qty: 30 | Refills: 5 | Status: DISCONTINUED | COMMUNITY
Start: 2022-02-03 | End: 2023-04-05

## 2023-04-05 NOTE — PHYSICAL EXAM
[Normal] : affect appropriate [de-identified] : No palpable breast mass or axillary adenopathy bilaterally

## 2023-04-05 NOTE — CONSULT LETTER
[Dear  ___] : Dear  [unfilled], [Consult Letter:] : I had the pleasure of evaluating your patient, [unfilled]. [Please see my note below.] : Please see my note below. [Consult Closing:] : Thank you very much for allowing me to participate in the care of this patient.  If you have any questions, please do not hesitate to contact me. [Sincerely,] : Sincerely, [DrRosario  ___] : Dr. AYALA [FreeTextEntry3] : Yulia Hairston MD\par Medical Oncology/Hematology\par [facility] [place], [facility]\par \par \par [facility] at [facility]\par

## 2023-04-05 NOTE — ASSESSMENT
[FreeTextEntry1] : 73 y/o postmenopausal female with stage IA triple negative breast cancer, pT1cN0. Underwent left breast savita  wide lumpectomy on 06/28/21. \par 7/28/21 medi port placed\par Completed 4 cycles of Taxotere / Cytoxan on 10/8/21. \par Completed adjuvant RT with Dr. Martin Conti of [facility] on 12/1/21. \par S/p port removal on 5/18/22\par \par \par CBE - benign\par CBC normal today\par \par Plan:\par Annual mammo/sono in 5/2023\par RTO 4 months

## 2023-04-05 NOTE — HISTORY OF PRESENT ILLNESS
[de-identified] : Ms. Gruber is a 70 year old female newly diagnosed with triple negative left breast cancer at age 70. She recently underwent bilateral screening mammogram 3/18/2021 which demonstrated a new mammographic mass at the 9:00 position of the left breast which was biopsied on 2021 and demonstrated poorly differentiated invasive carcinoma ER- NE- [id number] -\par \par 2021 Bilateral screening mammogram \par Left breast nodule\par Left breast sonography is recommended: BI-RADS-0: Incomplete: Needs Additional Imaging evaluation\par \par 2021 DEXA \par Osteopetrosis category with the lowest T score -2/9 within the lumbar spine\par \par 2021 Breast ultrasound Left Complete\par 9:00 5cm from the nipple, corresponding to the new mammographic mass there is a hypoechoic mass with irregular borders measuring 9 X 8 X 2mm cyst.\par Ultrasound- guided core biopsy is recommended\par There is a left axillary lymph node questionable mildly thickened cortex. Patient recently had a COVID vaccine and this may be reactive \par BI-RADS-4: Suspicious Findings 4-C high suspicion for malignancy \par \par 2021 Underwent Breast, left, 9 o'clock 5 cm FN, core biopsy\par - Invasive poorly differentiated ductal carcinoma\par - Farhad score 8/9 (3 + 3 + 2) in this limited material\par - Invasive tumor measures at least 0.7 cm\par - Ductal carcinoma in situ absent\par - Microcalcifications absent\par - Lymphovascular permeation by tumor not seen\par - As per outside pathology report:\par ER:  Negative 0% nuclear staining\par NE:  Negative 0% nuclear staining\par Her-2:  Negative (0 membrane staining)\par \par 2021 MR Breast w/wo IV Cont, Bilateral w/CAD \par 1.0 cm malignancy in the central left breast.\par No MRI evidence of multicentric or contralateral disease.  Study limited by moderate background enhancement.\par Nonspecific prominent left axillary lymph nodes which may be reactive in the setting of recent Covid vaccination.  Left axillary ultrasound evaluation and sampling of the most suspicious lymph node can be performed if requested.\par RECOMMENDATION:  Surgical or oncologic management. BI-RADS 6- Known Biopsy-Proven Malignancy\par \par 2021 US Axilla Only, Left             \par FINDINGS: No suspicious axillary lymph nodes are noted. A nonspecific benign-appearing 1.9 x 0.6 x 1.2 cm axillary lymph node is identified.\par IMPRESSION:No sonographically suspicious axillary lymph nodes.\par Surgical management of the known left breast cancer remains recommended.\par RECOMMENDATION: Surgical or oncologic management.BI-RADS 2 - Benign Finding(s)\par \par 21 Underwent left breast savita  wide lumpectomy with sentinel node biopsy.\par Surgical Final Report\par Final Diagnosis\par 1     Left sentinel lymph node #2 blue:\par - One lymph node, negative for metastatic carcinoma (0/1).\par 2     Left sentinel lymph node #1:\par - Two lymph node, negative for metastatic carcinoma (0/2).\par 3     Left breast savita wide lumpectomy:\par -        Invasive poorly differentiated mammary ductal carcinoma\par with apocrine features, Rulo score 8/9 (3 + 3 + 2),\par adjacent to previous biopsy cavity.\par -       The  invasive tumor measures measuring 1.3 cm, one focus.\par -       Ductal carcinoma in situ, cribriform, low to\par intermediate nuclear grade, without calcifications or necrosis.\par -       All surgical resection margins are negative, the\par tumor is located 0.15 cm from anterior aspect of the resection,\par the closest margin; see also the the following resection\par margins(4-9).\par 4     Left breast deep posterior margin:\par - Breast tissue, negative for dysplasia/neoplasia.\par 5     Left breast anterior margin:\par - Breast tissue, negative for dysplasia/neoplasia.\par 6     Left breast lateral margin:\par - Breast tissue, negative for dysplasia/neoplasia.\par 7     Left breast superior margin:\par - Breast tissue, negative for dysplasia/neoplasia. Minute\par focus of radial scar, completely excised.\par 8     Left breast inferior margin;\par - Breast tissue, negative for dysplasia/neoplasia.\par 9     Left breast medial margin;\par - Breast tissue, negative for dysplasia/neoplasia.\par \par Final Diagnosis\par Left breast, Lumpectomy ( one slide) 1 slide, [id number], HE3E)\par - Invasive poorly differentiated ductal carcinoma with apocrine\par features ( see comment for further details)\par - As per report tumor is triple negative ( ER, NE and Her-2\par Negative)\par Comment :\par Only one slide is provided for review. Margin of the specimen in\par this one slide is negative for carcinoma. 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 \par Previously was evaluated by [facility] in [place], planning for RT in the future at that site due to vicinity to home \par Former smoker, retired\par \par 21 [facility] Genetic Result: Negative: No clinically significant mutation identified \par PMH: Osteoporosis, Hyperlipidemia \par SH: Bilateral tubal ligation, right ear surgical procedure () \par FH: Mother: stomach cancer; twin sister: esophageal cancer,   [de-identified] : Returns for follow up visit.\par Denies any pain\par Denies headaches or dizziness.\par NO n/v/d/c.\par s/p colonoscopy in 2/2023\par

## 2023-04-06 LAB
ALBUMIN SERPL ELPH-MCNC: 4.6 G/DL
ALP BLD-CCNC: 79 U/L
ALT SERPL-CCNC: 20 U/L
ANION GAP SERPL CALC-SCNC: 10 MMOL/L
AST SERPL-CCNC: 20 U/L
BILIRUB SERPL-MCNC: 0.2 MG/DL
BUN SERPL-MCNC: 16 MG/DL
CALCIUM SERPL-MCNC: 10.2 MG/DL
CHLORIDE SERPL-SCNC: 105 MMOL/L
CO2 SERPL-SCNC: 29 MMOL/L
CREAT SERPL-MCNC: 0.94 MG/DL
EGFR: 65 ML/MIN/1.73M2
GLUCOSE SERPL-MCNC: 97 MG/DL
POTASSIUM SERPL-SCNC: 5.3 MMOL/L
PROT SERPL-MCNC: 6.9 G/DL
SODIUM SERPL-SCNC: 144 MMOL/L

## 2023-04-20 ENCOUNTER — OFFICE (OUTPATIENT)
Dept: URBAN - METROPOLITAN AREA CLINIC 1 | Facility: CLINIC | Age: 73
Setting detail: OPHTHALMOLOGY
End: 2023-04-20
Payer: MEDICARE

## 2023-04-20 DIAGNOSIS — H01.002: ICD-10-CM

## 2023-04-20 DIAGNOSIS — H11.823: ICD-10-CM

## 2023-04-20 DIAGNOSIS — H40.033: ICD-10-CM

## 2023-04-20 DIAGNOSIS — H11.152: ICD-10-CM

## 2023-04-20 DIAGNOSIS — H25.13: ICD-10-CM

## 2023-04-20 DIAGNOSIS — H01.004: ICD-10-CM

## 2023-04-20 DIAGNOSIS — H40.013: ICD-10-CM

## 2023-04-20 DIAGNOSIS — H01.001: ICD-10-CM

## 2023-04-20 DIAGNOSIS — H16.223: ICD-10-CM

## 2023-04-20 DIAGNOSIS — H01.003: ICD-10-CM

## 2023-04-20 PROCEDURE — 92133 CPTRZD OPH DX IMG PST SGM ON: CPT | Performed by: OPHTHALMOLOGY

## 2023-04-20 PROCEDURE — 99214 OFFICE O/P EST MOD 30 MIN: CPT | Performed by: OPHTHALMOLOGY

## 2023-04-20 PROCEDURE — 92083 EXTENDED VISUAL FIELD XM: CPT | Performed by: OPHTHALMOLOGY

## 2023-04-20 ASSESSMENT — LID EXAM ASSESSMENTS
OS_BLEPHARITIS: T
OD_COMMENTS: 1+ AR
OS_COMMENTS: 1+ AR
OD_BLEPHARITIS: T
OS_MEIBOMITIS: LLL

## 2023-04-20 ASSESSMENT — REFRACTION_AUTOREFRACTION
OS_CYLINDER: -0.50
OS_SPHERE: +2.00
OS_AXIS: 50
OD_SPHERE: +3.00
OD_AXIS: 112
OD_CYLINDER: -1.00

## 2023-04-20 ASSESSMENT — REFRACTION_CURRENTRX
OD_OVR_VA: 20/
OS_CYLINDER: +0.25
OD_ADD: +3.00
OS_ADD: +3.00
OD_ADD: +2.75
OS_VPRISM_DIRECTION: PROGS
OD_AXIS: 116
OS_ADD: +2.75
OD_CYLINDER: -0.50
OS_AXIS: 178
OD_VPRISM_DIRECTION: PROGS
OD_SPHERE: +2.00
OS_SPHERE: +0.25
OS_VPRISM_DIRECTION: PROGS
OD_CYLINDER: +0.25
OD_AXIS: 023
OD_SPHERE: +0.75
OS_OVR_VA: 20/
OS_OVR_VA: 20/
OS_SPHERE: +1.25
OS_CYLINDER: -0.25
OS_AXIS: 167
OD_VPRISM_DIRECTION: PROGS
OD_OVR_VA: 20/

## 2023-04-20 ASSESSMENT — PACHYMETRY
OD_CT_UM: 551
OD_CT_CORRECTION: -1
OS_CT_CORRECTION: 0
OS_CT_UM: 542

## 2023-04-20 ASSESSMENT — REFRACTION_MANIFEST
OD_SPHERE: +3.00
OD_CYLINDER: -0.75
OS_SPHERE: +2.00
OD_AXIS: 110
OS_ADD: +2.50
OS_AXIS: 50
OD_ADD: +2.50
OS_CYLINDER: -0.50
OS_VA1: 20/25+
OD_VA1: 20/25-

## 2023-04-20 ASSESSMENT — AXIALLENGTH_DERIVED
OS_AL: 21.8545
OS_AL: 21.8545
OD_AL: 22.5696
OD_AL: 22.6142

## 2023-04-20 ASSESSMENT — DECREASING TEAR LAKE - SEVERITY SCORE
OS_DEC_TEARLAKE: T
OD_DEC_TEARLAKE: T

## 2023-04-20 ASSESSMENT — KERATOMETRY
OS_AXISANGLE_DEGREES: 002
OD_K1POWER_DIOPTERS: 43.00
OD_AXISANGLE_DEGREES: 89
OD_K2POWER_DIOPTERS: 44.25
OS_K1POWER_DIOPTERS: 46.50
OS_K2POWER_DIOPTERS: 47.00
METHOD_AUTO_MANUAL: AUTO

## 2023-04-20 ASSESSMENT — CONFRONTATIONAL VISUAL FIELD TEST (CVF)
OD_FINDINGS: FULL
OS_FINDINGS: FULL

## 2023-04-20 ASSESSMENT — SPHEQUIV_DERIVED
OS_SPHEQUIV: 1.75
OD_SPHEQUIV: 2.625
OD_SPHEQUIV: 2.5
OS_SPHEQUIV: 1.75

## 2023-04-20 ASSESSMENT — LID POSITION - DERMATOCHALASIS
OD_DERMATOCHALASIS: 1+
OS_DERMATOCHALASIS: 1+

## 2023-04-20 ASSESSMENT — TONOMETRY
OS_IOP_MMHG: 21
OD_IOP_MMHG: 20

## 2023-04-20 ASSESSMENT — VISUAL ACUITY
OS_BCVA: 20/25-
OD_BCVA: 20/25+

## 2023-05-26 ENCOUNTER — APPOINTMENT (OUTPATIENT)
Dept: MAMMOGRAPHY | Facility: CLINIC | Age: 73
End: 2023-05-26
Payer: MEDICARE

## 2023-05-26 ENCOUNTER — OUTPATIENT (OUTPATIENT)
Dept: OUTPATIENT SERVICES | Facility: HOSPITAL | Age: 73
LOS: 1 days | End: 2023-05-26
Payer: MEDICARE

## 2023-05-26 ENCOUNTER — APPOINTMENT (OUTPATIENT)
Dept: ULTRASOUND IMAGING | Facility: CLINIC | Age: 73
End: 2023-05-26
Payer: MEDICARE

## 2023-05-26 ENCOUNTER — RESULT REVIEW (OUTPATIENT)
Age: 73
End: 2023-05-26

## 2023-05-26 DIAGNOSIS — Z98.890 OTHER SPECIFIED POSTPROCEDURAL STATES: Chronic | ICD-10-CM

## 2023-05-26 DIAGNOSIS — Z12.31 ENCOUNTER FOR SCREENING MAMMOGRAM FOR MALIGNANT NEOPLASM OF BREAST: ICD-10-CM

## 2023-05-26 PROCEDURE — G0279: CPT | Mod: 26

## 2023-05-26 PROCEDURE — 76641 ULTRASOUND BREAST COMPLETE: CPT | Mod: 26,50,GA

## 2023-05-26 PROCEDURE — 77066 DX MAMMO INCL CAD BI: CPT

## 2023-05-26 PROCEDURE — 77066 DX MAMMO INCL CAD BI: CPT | Mod: 26

## 2023-05-26 PROCEDURE — G0279: CPT

## 2023-05-26 PROCEDURE — 76641 ULTRASOUND BREAST COMPLETE: CPT

## 2023-06-20 NOTE — REASON FOR VISIT
Dr Antoni Miller Consideration of Curative Therapy] : consideration of curative therapy for [Breast Cancer] : breast cancer [Other: _____] : [unfilled]

## 2023-07-28 PROBLEM — H35.033 HYPERTENSIVE RETINOPATHY; BOTH: Status: ACTIVE | Noted: 2023-07-28

## 2023-07-31 PROBLEM — H43.811 VITREOUS DETACHMENT; RIGHT EYE: Status: ACTIVE | Noted: 2023-07-31

## 2023-08-02 ENCOUNTER — OUTPATIENT (OUTPATIENT)
Dept: OUTPATIENT SERVICES | Facility: HOSPITAL | Age: 73
LOS: 1 days | Discharge: HOME | End: 2023-08-02

## 2023-08-02 DIAGNOSIS — Z98.890 OTHER SPECIFIED POSTPROCEDURAL STATES: Chronic | ICD-10-CM

## 2023-08-02 DIAGNOSIS — D50.9 IRON DEFICIENCY ANEMIA, UNSPECIFIED: ICD-10-CM

## 2023-08-08 ENCOUNTER — RESULT REVIEW (OUTPATIENT)
Age: 73
End: 2023-08-08

## 2023-08-08 ENCOUNTER — APPOINTMENT (OUTPATIENT)
Dept: HEMATOLOGY ONCOLOGY | Facility: CLINIC | Age: 73
End: 2023-08-08
Payer: MEDICARE

## 2023-08-08 VITALS
HEART RATE: 74 BPM | BODY MASS INDEX: 25.62 KG/M2 | WEIGHT: 139.22 LBS | OXYGEN SATURATION: 94 % | TEMPERATURE: 97.6 F | DIASTOLIC BLOOD PRESSURE: 78 MMHG | SYSTOLIC BLOOD PRESSURE: 138 MMHG | HEIGHT: 62 IN

## 2023-08-08 LAB
BASOPHILS # BLD AUTO: 0.1 K/UL — SIGNIFICANT CHANGE UP (ref 0–0.2)
BASOPHILS NFR BLD AUTO: 1.6 % — SIGNIFICANT CHANGE UP (ref 0–2)
EOSINOPHIL # BLD AUTO: 0.3 K/UL — SIGNIFICANT CHANGE UP (ref 0–0.5)
EOSINOPHIL NFR BLD AUTO: 4.3 % — SIGNIFICANT CHANGE UP (ref 0–6)
HCT VFR BLD CALC: 41.1 % — SIGNIFICANT CHANGE UP (ref 34.5–45)
HGB BLD-MCNC: 13.8 G/DL — SIGNIFICANT CHANGE UP (ref 11.5–15.5)
LYMPHOCYTES # BLD AUTO: 2.1 K/UL — SIGNIFICANT CHANGE UP (ref 1–3.3)
LYMPHOCYTES # BLD AUTO: 29.6 % — SIGNIFICANT CHANGE UP (ref 13–44)
MCHC RBC-ENTMCNC: 31.6 PG — SIGNIFICANT CHANGE UP (ref 27–34)
MCHC RBC-ENTMCNC: 33.4 G/DL — SIGNIFICANT CHANGE UP (ref 32–36)
MCV RBC AUTO: 94.7 FL — SIGNIFICANT CHANGE UP (ref 80–100)
MONOCYTES # BLD AUTO: 0.5 K/UL — SIGNIFICANT CHANGE UP (ref 0–0.9)
MONOCYTES NFR BLD AUTO: 7.1 % — SIGNIFICANT CHANGE UP (ref 2–14)
NEUTROPHILS # BLD AUTO: 4.1 K/UL — SIGNIFICANT CHANGE UP (ref 1.8–7.4)
NEUTROPHILS NFR BLD AUTO: 57.4 % — SIGNIFICANT CHANGE UP (ref 43–77)
PLATELET # BLD AUTO: 288 K/UL — SIGNIFICANT CHANGE UP (ref 150–400)
RBC # BLD: 4.34 M/UL — SIGNIFICANT CHANGE UP (ref 3.8–5.2)
RBC # FLD: 11.1 % — SIGNIFICANT CHANGE UP (ref 10.3–14.5)
WBC # BLD: 7.2 K/UL — SIGNIFICANT CHANGE UP (ref 3.8–10.5)
WBC # FLD AUTO: 7.2 K/UL — SIGNIFICANT CHANGE UP (ref 3.8–10.5)

## 2023-08-08 PROCEDURE — 99214 OFFICE O/P EST MOD 30 MIN: CPT

## 2023-08-08 RX ORDER — ALPRAZOLAM 0.25 MG/1
0.25 TABLET ORAL
Qty: 30 | Refills: 0 | Status: DISCONTINUED | COMMUNITY
Start: 2021-06-02 | End: 2023-08-08

## 2023-08-08 RX ORDER — AMMONIUM LACTATE 12 %
12 CREAM (GRAM) TOPICAL
Qty: 385 | Refills: 0 | Status: DISCONTINUED | COMMUNITY
Start: 2021-02-09 | End: 2023-08-08

## 2023-08-08 RX ORDER — KETOCONAZOLE 20 MG/G
2 CREAM TOPICAL
Qty: 60 | Refills: 0 | Status: DISCONTINUED | COMMUNITY
Start: 2021-01-19 | End: 2023-08-08

## 2023-08-08 RX ORDER — CLOBETASOL PROPIONATE 0.5 MG/G
0.05 CREAM TOPICAL
Qty: 1 | Refills: 2 | Status: DISCONTINUED | COMMUNITY
Start: 2021-02-12 | End: 2023-08-08

## 2023-08-08 RX ORDER — BETAMETHASONE VALERATE 1 MG/G
0.1 CREAM TOPICAL DAILY
Qty: 1 | Refills: 3 | Status: DISCONTINUED | COMMUNITY
Start: 2023-04-03 | End: 2023-08-08

## 2023-08-08 NOTE — PHYSICAL EXAM
[Normal] : RRR, normal S1S2, no murmurs, rubs, gallops [de-identified] : No palpable breast mass or axillary adenopathy bilaterally  [de-identified] : Soft, nontender, nondistended

## 2023-08-08 NOTE — ASSESSMENT
[FreeTextEntry1] : 73 y/o postmenopausal female with stage IA triple negative breast cancer, pT1cN0. Underwent left breast savita  wide lumpectomy on 06/28/21.  7/28/21 medi port placed Completed 4 cycles of Taxotere / Cytoxan on 10/8/21.  Completed adjuvant RT with Dr. Martin Conti of [facility] on 12/1/21.  S/p port removal on 5/18/22  Reviewed 05/26/2023 Bilateral mammo/sono- Stable postlumpectomy changes in the left breast. No mammographic or sonographic evidence of malignancy. CBC from today is normal   Plan: Follow up with breast surgeon Dr Dorsey, anual mammo/sono in 5/2024  Continue age appropriate preventative screening and f/u with PCP  Advised to call with concerns/symptoms Follow up in 4 months with Dr Hairston

## 2023-08-08 NOTE — HISTORY OF PRESENT ILLNESS
[de-identified] : Ms. Gruber is a 70 year old female newly diagnosed with triple negative left breast cancer at age 70. She recently underwent bilateral screening mammogram 3/18/2021 which demonstrated a new mammographic mass at the 9:00 position of the left breast which was biopsied on 2021 and demonstrated poorly differentiated invasive carcinoma ER- SD- [id number] -\par  \par  2021 Bilateral screening mammogram \par  Left breast nodule\par  Left breast sonography is recommended: BI-RADS-0: Incomplete: Needs Additional Imaging evaluation\par  \par  2021 DEXA \par  Osteopetrosis category with the lowest T score -2/9 within the lumbar spine\par  \par  2021 Breast ultrasound Left Complete\par  9:00 5cm from the nipple, corresponding to the new mammographic mass there is a hypoechoic mass with irregular borders measuring 9 X 8 X 2mm cyst.\par  Ultrasound- guided core biopsy is recommended\par  There is a left axillary lymph node questionable mildly thickened cortex. Patient recently had a COVID vaccine and this may be reactive \par  BI-RADS-4: Suspicious Findings 4-C high suspicion for malignancy \par  \par  2021 Underwent Breast, left, 9 o'clock 5 cm FN, core biopsy\par  - Invasive poorly differentiated ductal carcinoma\par  - Farhad score 8/9 (3 + 3 + 2) in this limited material\par  - Invasive tumor measures at least 0.7 cm\par  - Ductal carcinoma in situ absent\par  - Microcalcifications absent\par  - Lymphovascular permeation by tumor not seen\par  - As per outside pathology report:\par  ER:  Negative 0% nuclear staining\par  SD:  Negative 0% nuclear staining\par  Her-2:  Negative (0 membrane staining)\par  \par  2021 MR Breast w/wo IV Cont, Bilateral w/CAD \par  1.0 cm malignancy in the central left breast.\par  No MRI evidence of multicentric or contralateral disease.  Study limited by moderate background enhancement.\par  Nonspecific prominent left axillary lymph nodes which may be reactive in the setting of recent Covid vaccination.  Left axillary ultrasound evaluation and sampling of the most suspicious lymph node can be performed if requested.\par  RECOMMENDATION:  Surgical or oncologic management. BI-RADS 6- Known Biopsy-Proven Malignancy\par  \par  2021 US Axilla Only, Left             \par  FINDINGS: No suspicious axillary lymph nodes are noted. A nonspecific benign-appearing 1.9 x 0.6 x 1.2 cm axillary lymph node is identified.\par  IMPRESSION:No sonographically suspicious axillary lymph nodes.\par  Surgical management of the known left breast cancer remains recommended.\par  RECOMMENDATION: Surgical or oncologic management.BI-RADS 2 - Benign Finding(s)\par  \par  21 Underwent left breast savita  wide lumpectomy with sentinel node biopsy.\par  Surgical Final Report\par  Final Diagnosis\par  1     Left sentinel lymph node #2 blue:\par  - One lymph node, negative for metastatic carcinoma (0/1).\par  2     Left sentinel lymph node #1:\par  - Two lymph node, negative for metastatic carcinoma (0/2).\par  3     Left breast savita wide lumpectomy:\par  -        Invasive poorly differentiated mammary ductal carcinoma\par  with apocrine features, Lavon score 8/9 (3 + 3 + 2),\par  adjacent to previous biopsy cavity.\par  -       The  invasive tumor measures measuring 1.3 cm, one focus.\par  -       Ductal carcinoma in situ, cribriform, low to\par  intermediate nuclear grade, without calcifications or necrosis.\par  -       All surgical resection margins are negative, the\par  tumor is located 0.15 cm from anterior aspect of the resection,\par  the closest margin; see also the the following resection\par  margins(4-9).\par  4     Left breast deep posterior margin:\par  - Breast tissue, negative for dysplasia/neoplasia.\par  5     Left breast anterior margin:\par  - Breast tissue, negative for dysplasia/neoplasia.\par  6     Left breast lateral margin:\par  - Breast tissue, negative for dysplasia/neoplasia.\par  7     Left breast superior margin:\par  - Breast tissue, negative for dysplasia/neoplasia. Minute\par  focus of radial scar, completely excised.\par  8     Left breast inferior margin;\par  - Breast tissue, negative for dysplasia/neoplasia.\par  9     Left breast medial margin;\par  - Breast tissue, negative for dysplasia/neoplasia.\par  \par  Final Diagnosis\par  Left breast, Lumpectomy ( one slide) 1 slide, [id number], HE3E)\par  - Invasive poorly differentiated ductal carcinoma with apocrine\par  features ( see comment for further details)\par  - As per report tumor is triple negative ( ER, SD and Her-2\par  Negative)\par  Comment :\par  Only one slide is provided for review. Margin of the specimen in\par  this one slide is negative for carcinoma. 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  Feels overall very well Good energy levels Remains active, exercising/walking 3-4 miles  Notes weight gain. Actively trying to lose weight  Denies headache, dizziness, pain  Denies dyspnea, cough Denies abdominal pain, nausea, vomiting. Good appetite  Followed up with breast surgeon Dr Dorsey in 6/2023, notes normal findings

## 2023-08-08 NOTE — CONSULT LETTER
[Dear  ___] : Dear  [unfilled], [Consult Letter:] : I had the pleasure of evaluating your patient, [unfilled]. [Please see my note below.] : Please see my note below. [Consult Closing:] : Thank you very much for allowing me to participate in the care of this patient.  If you have any questions, please do not hesitate to contact me. [Sincerely,] : Sincerely, [DrRosario  ___] : Dr. AYALA [FreeTextEntry3] : Yulia Hairston MD\par  Medical Oncology/Hematology\par  [facility] [place], [facility]\par  \par  \par  [facility]\par

## 2023-08-14 LAB
ALBUMIN SERPL ELPH-MCNC: 4.6 G/DL
ALP BLD-CCNC: 69 U/L
ALT SERPL-CCNC: 23 U/L
ANION GAP SERPL CALC-SCNC: 13 MMOL/L
AST SERPL-CCNC: 19 U/L
BILIRUB SERPL-MCNC: 0.4 MG/DL
BUN SERPL-MCNC: 12 MG/DL
CALCIUM SERPL-MCNC: 9.6 MG/DL
CHLORIDE SERPL-SCNC: 106 MMOL/L
CO2 SERPL-SCNC: 25 MMOL/L
CREAT SERPL-MCNC: 0.96 MG/DL
EGFR: 63 ML/MIN/1.73M2
GLUCOSE SERPL-MCNC: 102 MG/DL
POTASSIUM SERPL-SCNC: 5.2 MMOL/L
PROT SERPL-MCNC: 7.1 G/DL
SODIUM SERPL-SCNC: 144 MMOL/L

## 2023-08-22 NOTE — ASSESSMENT
[FreeTextEntry1] : 69 y/o postmenopausal female presents newly diagnosed triple negative breast cancer measuring 1.3cm grade 3 with DCIS and clear margins with 0/3 nodes. Underwent left breast savita  wide lumpectomy on 06/28/21. \par \par S/p C1 Taxotere/Cytoxan with onpro support and use of Dignicap, tolerated well\par CBC w dif reviewed from today WBC 8.0 K HGB 11.7 g HCT 36.1 %  K ANC 5800\par \par Plan:\par Continue Taxotere and Cytoxan with onpro support every 3 weeks X 4 cycles, C2 8/27/21\par Bone pain due to onpro: Tylenol PRN or Tramadol PRN\par Advised to call with any concerns/symptoms \par Follow up 9/8/21 with Dr. Hairston 
no

## 2023-09-11 NOTE — BRIEF OPERATIVE NOTE - ASSISTANT(S)
----- Message from Netta Lino sent at 9/11/2023  1:32 PM CDT -----  Having Prostrate problems similar  at to a uti needs appointment [phone]     Anthony Dean, PGY3

## 2023-09-21 ENCOUNTER — OFFICE (OUTPATIENT)
Dept: URBAN - METROPOLITAN AREA CLINIC 1 | Facility: CLINIC | Age: 73
Setting detail: OPHTHALMOLOGY
End: 2023-09-21
Payer: MEDICARE

## 2023-09-21 DIAGNOSIS — H25.13: ICD-10-CM

## 2023-09-21 DIAGNOSIS — H25.11: ICD-10-CM

## 2023-09-21 DIAGNOSIS — H40.013: ICD-10-CM

## 2023-09-21 PROCEDURE — 92250 FUNDUS PHOTOGRAPHY W/I&R: CPT | Performed by: OPHTHALMOLOGY

## 2023-09-21 PROCEDURE — 99214 OFFICE O/P EST MOD 30 MIN: CPT | Performed by: OPHTHALMOLOGY

## 2023-09-21 PROCEDURE — 92136 OPHTHALMIC BIOMETRY: CPT | Performed by: OPHTHALMOLOGY

## 2023-09-21 ASSESSMENT — SPHEQUIV_DERIVED
OD_SPHEQUIV: 2.625
OS_SPHEQUIV: 1.75
OS_SPHEQUIV: 1.75
OD_SPHEQUIV: 2.625

## 2023-09-21 ASSESSMENT — PACHYMETRY
OS_CT_CORRECTION: 0
OD_CT_UM: 551
OS_CT_UM: 542
OD_CT_CORRECTION: -1

## 2023-09-21 ASSESSMENT — KERATOMETRY
OD_AXISANGLE_DEGREES: 27
OS_K1POWER_DIOPTERS: 46.50
OS_K2POWER_DIOPTERS: 47.00
OD_K2POWER_DIOPTERS: 47.50
OD_K1POWER_DIOPTERS: 46.25
METHOD_AUTO_MANUAL: AUTO
OS_AXISANGLE_DEGREES: 172

## 2023-09-21 ASSESSMENT — REFRACTION_CURRENTRX
OS_CYLINDER: +0.25
OS_AXIS: 178
OD_ADD: +3.00
OD_SPHERE: +0.75
OD_VPRISM_DIRECTION: PROGS
OS_ADD: +3.00
OD_AXIS: 116
OD_SPHERE: +2.00
OS_SPHERE: +1.25
OS_SPHERE: +0.25
OD_CYLINDER: +0.25
OS_VPRISM_DIRECTION: PROGS
OS_OVR_VA: 20/
OD_OVR_VA: 20/
OD_ADD: +2.75
OD_AXIS: 023
OD_OVR_VA: 20/
OS_VPRISM_DIRECTION: PROGS
OS_ADD: +2.75
OS_AXIS: 167
OD_CYLINDER: -0.50
OD_VPRISM_DIRECTION: PROGS
OS_CYLINDER: -0.25
OS_OVR_VA: 20/

## 2023-09-21 ASSESSMENT — REFRACTION_AUTOREFRACTION
OS_SPHERE: +2.00
OS_CYLINDER: -0.50
OD_CYLINDER: -1.25
OD_SPHERE: +3.25
OS_AXIS: 37
OD_AXIS: 103

## 2023-09-21 ASSESSMENT — LID EXAM ASSESSMENTS
OS_MEIBOMITIS: LLL
OS_COMMENTS: 1+ AR
OD_BLEPHARITIS: T
OS_BLEPHARITIS: T
OD_COMMENTS: 1+ AR

## 2023-09-21 ASSESSMENT — TONOMETRY
OD_IOP_MMHG: 19
OS_IOP_MMHG: 18

## 2023-09-21 ASSESSMENT — REFRACTION_MANIFEST
OD_CYLINDER: -0.75
OS_SPHERE: +2.00
OD_AXIS: 110
OS_AXIS: 50
OS_ADD: +2.50
OS_VA1: 20/25+
OD_SPHERE: +3.00
OD_ADD: +2.50
OS_CYLINDER: -0.50
OD_VA1: 20/25-

## 2023-09-21 ASSESSMENT — VISUAL ACUITY
OS_BCVA: 20/40+
OD_BCVA: 20/30+

## 2023-09-21 ASSESSMENT — AXIALLENGTH_DERIVED
OD_AL: 21.5275
OD_AL: 21.5275
OS_AL: 21.8545
OS_AL: 21.8545

## 2023-09-21 ASSESSMENT — DECREASING TEAR LAKE - SEVERITY SCORE
OD_DEC_TEARLAKE: T
OS_DEC_TEARLAKE: T

## 2023-09-21 ASSESSMENT — LID POSITION - DERMATOCHALASIS
OD_DERMATOCHALASIS: 1+
OS_DERMATOCHALASIS: 1+

## 2023-09-21 ASSESSMENT — CONFRONTATIONAL VISUAL FIELD TEST (CVF)
OD_FINDINGS: FULL
OS_FINDINGS: FULL

## 2023-10-05 ENCOUNTER — ASC (OUTPATIENT)
Dept: URBAN - METROPOLITAN AREA SURGERY 8 | Facility: SURGERY | Age: 73
Setting detail: OPHTHALMOLOGY
End: 2023-10-05
Payer: MEDICARE

## 2023-10-05 DIAGNOSIS — H25.11: ICD-10-CM

## 2023-10-05 DIAGNOSIS — H52.211: ICD-10-CM

## 2023-10-05 DIAGNOSIS — H57.01: ICD-10-CM

## 2023-10-05 PROCEDURE — FEMTO FEMTOSECOND LASER: Mod: GY | Performed by: OPHTHALMOLOGY

## 2023-10-05 PROCEDURE — 66982 XCAPSL CTRC RMVL CPLX WO ECP: CPT | Mod: RT | Performed by: OPHTHALMOLOGY

## 2023-10-05 PROCEDURE — A9270 NON-COVERED ITEM OR SERVICE: HCPCS | Mod: GY | Performed by: OPHTHALMOLOGY

## 2023-10-06 ENCOUNTER — OFFICE (OUTPATIENT)
Dept: URBAN - METROPOLITAN AREA CLINIC 6 | Facility: CLINIC | Age: 73
Setting detail: OPHTHALMOLOGY
End: 2023-10-06
Payer: MEDICARE

## 2023-10-06 ENCOUNTER — RX ONLY (RX ONLY)
Age: 73
End: 2023-10-06

## 2023-10-06 VITALS — HEIGHT: 55 IN

## 2023-10-06 DIAGNOSIS — Z96.1: ICD-10-CM

## 2023-10-06 PROBLEM — H40.013 GLAUCOMA SUSPECT, LOW RISK 1-2 FACTORS; BOTH EYES: Status: ACTIVE | Noted: 2023-09-21

## 2023-10-06 PROBLEM — H16.223 DRY EYE SYNDROME K SICCA; BOTH EYES: Status: ACTIVE | Noted: 2023-09-21

## 2023-10-06 PROCEDURE — 99024 POSTOP FOLLOW-UP VISIT: CPT

## 2023-10-06 ASSESSMENT — LID EXAM ASSESSMENTS
OS_COMMENTS: 1+ AR
OS_BLEPHARITIS: T
OD_BLEPHARITIS: T
OD_COMMENTS: 1+ AR
OS_MEIBOMITIS: LLL

## 2023-10-06 ASSESSMENT — DECREASING TEAR LAKE - SEVERITY SCORE
OD_DEC_TEARLAKE: T
OS_DEC_TEARLAKE: T

## 2023-10-06 ASSESSMENT — REFRACTION_CURRENTRX
OS_VPRISM_DIRECTION: PROGS
OD_AXIS: 088
OD_SPHERE: +2.00
OS_CYLINDER: +0.25
OD_CYLINDER: +0.25
OS_OVR_VA: 20/
OD_OVR_VA: 20/
OS_VPRISM_DIRECTION: PROGS
OS_SPHERE: +1.25
OS_CYLINDER: 0.00
OS_AXIS: 167
OS_OVR_VA: 20/
OD_VPRISM_DIRECTION: PROGS
OD_VPRISM_DIRECTION: PROGS
OD_AXIS: 023
OS_ADD: +3.00
OD_CYLINDER: -0.25
OD_SPHERE: +0.75
OD_ADD: +3.00
OS_SPHERE: +0.25
OD_OVR_VA: 20/
OS_ADD: +2.50
OS_AXIS: 000
OD_ADD: +2.50

## 2023-10-06 ASSESSMENT — VISUAL ACUITY
OS_BCVA: 20/25-2
OD_BCVA: 20/30-1

## 2023-10-06 ASSESSMENT — REFRACTION_MANIFEST
OS_ADD: +2.50
OS_VA1: 20/25+
OD_VA1: 20/25-
OS_AXIS: 50
OD_ADD: +2.50
OD_SPHERE: +3.00
OS_SPHERE: +2.00
OS_CYLINDER: -0.50
OD_CYLINDER: -0.75
OD_AXIS: 110

## 2023-10-06 ASSESSMENT — SPHEQUIV_DERIVED
OS_SPHEQUIV: 1.75
OD_SPHEQUIV: 0.625
OS_SPHEQUIV: 1.75
OD_SPHEQUIV: 2.625

## 2023-10-06 ASSESSMENT — REFRACTION_AUTOREFRACTION
OS_SPHERE: +2.00
OD_CYLINDER: -1.25
OS_CYLINDER: -0.50
OS_AXIS: 041
OD_AXIS: 084
OD_SPHERE: +1.25

## 2023-10-06 ASSESSMENT — KERATOMETRY
OD_K1POWER_DIOPTERS: 46.00
OD_AXISANGLE_DEGREES: 007
OS_AXISANGLE_DEGREES: 161
METHOD_AUTO_MANUAL: AUTO
OS_K1POWER_DIOPTERS: 46.50
OD_K2POWER_DIOPTERS: 46.75
OS_K2POWER_DIOPTERS: 47.25

## 2023-10-06 ASSESSMENT — AXIALLENGTH_DERIVED
OD_AL: 22.3602
OD_AL: 21.6815
OS_AL: 21.8151
OS_AL: 21.8151

## 2023-10-06 ASSESSMENT — LID POSITION - DERMATOCHALASIS
OS_DERMATOCHALASIS: 1+
OD_DERMATOCHALASIS: 1+

## 2023-10-06 ASSESSMENT — TONOMETRY
OS_IOP_MMHG: 16
OD_IOP_MMHG: 16

## 2023-10-06 ASSESSMENT — PACHYMETRY
OS_CT_CORRECTION: 0
OS_CT_UM: 542
OD_CT_UM: 551
OD_CT_CORRECTION: -1

## 2023-10-11 ENCOUNTER — OFFICE (OUTPATIENT)
Dept: URBAN - METROPOLITAN AREA CLINIC 1 | Facility: CLINIC | Age: 73
Setting detail: OPHTHALMOLOGY
End: 2023-10-11
Payer: MEDICARE

## 2023-10-11 DIAGNOSIS — H25.12: ICD-10-CM

## 2023-10-11 PROCEDURE — 92136 OPHTHALMIC BIOMETRY: CPT | Mod: 26,LT | Performed by: OPHTHALMOLOGY

## 2023-10-11 ASSESSMENT — REFRACTION_AUTOREFRACTION
OS_SPHERE: +2.00
OS_CYLINDER: -0.50
OD_SPHERE: +1.00
OD_CYLINDER: -1.50
OS_AXIS: 041
OD_AXIS: 88

## 2023-10-11 ASSESSMENT — REFRACTION_CURRENTRX
OD_VPRISM_DIRECTION: PROGS
OS_SPHERE: +1.25
OS_OVR_VA: 20/
OD_AXIS: 023
OS_AXIS: 167
OD_OVR_VA: 20/
OS_VPRISM_DIRECTION: PROGS
OD_ADD: +3.00
OS_CYLINDER: 0.00
OS_VPRISM_DIRECTION: PROGS
OD_SPHERE: +0.75
OD_OVR_VA: 20/
OS_CYLINDER: +0.25
OD_ADD: +2.50
OS_OVR_VA: 20/
OD_CYLINDER: +0.25
OS_AXIS: 000
OS_ADD: +3.00
OD_CYLINDER: -0.25
OD_SPHERE: +2.00
OS_SPHERE: +0.25
OS_ADD: +2.50
OD_AXIS: 088
OD_VPRISM_DIRECTION: PROGS

## 2023-10-11 ASSESSMENT — LID POSITION - DERMATOCHALASIS
OD_DERMATOCHALASIS: 1+
OS_DERMATOCHALASIS: 1+

## 2023-10-11 ASSESSMENT — SPHEQUIV_DERIVED
OD_SPHEQUIV: 0.25
OD_SPHEQUIV: 2.625
OS_SPHEQUIV: 1.75
OS_SPHEQUIV: 1.75

## 2023-10-11 ASSESSMENT — REFRACTION_MANIFEST
OD_ADD: +2.50
OS_AXIS: 50
OS_CYLINDER: -0.50
OD_VA1: 20/25-
OS_SPHERE: +2.00
OD_CYLINDER: -0.75
OS_VA1: 20/25+
OD_SPHERE: +3.00
OD_AXIS: 110
OS_ADD: +2.50

## 2023-10-11 ASSESSMENT — PACHYMETRY
OD_CT_UM: 551
OS_CT_UM: 542
OS_CT_CORRECTION: 0
OD_CT_CORRECTION: -1

## 2023-10-11 ASSESSMENT — VISUAL ACUITY
OS_BCVA: 20/20
OD_BCVA: 20/30

## 2023-10-11 ASSESSMENT — KERATOMETRY
OS_AXISANGLE_DEGREES: 174
OS_K2POWER_DIOPTERS: 47.00
OD_K1POWER_DIOPTERS: 47.00
METHOD_AUTO_MANUAL: AUTO
OD_AXISANGLE_DEGREES: 156
OS_K1POWER_DIOPTERS: 46.75
OD_K2POWER_DIOPTERS: 47.50

## 2023-10-11 ASSESSMENT — TONOMETRY
OD_IOP_MMHG: 19
OS_IOP_MMHG: 18

## 2023-10-11 ASSESSMENT — CONFRONTATIONAL VISUAL FIELD TEST (CVF)
OS_FINDINGS: FULL
OD_FINDINGS: FULL

## 2023-10-11 ASSESSMENT — DECREASING TEAR LAKE - SEVERITY SCORE
OD_DEC_TEARLAKE: T
OS_DEC_TEARLAKE: T

## 2023-10-11 ASSESSMENT — AXIALLENGTH_DERIVED
OD_AL: 21.4134
OS_AL: 21.8151
OS_AL: 21.8151
OD_AL: 22.2039

## 2023-10-11 ASSESSMENT — LID EXAM ASSESSMENTS
OS_MEIBOMITIS: LLL
OD_COMMENTS: 1+ AR
OD_BLEPHARITIS: T
OS_BLEPHARITIS: T
OS_COMMENTS: 1+ AR

## 2023-10-25 ENCOUNTER — ASC (OUTPATIENT)
Dept: URBAN - METROPOLITAN AREA SURGERY 8 | Facility: SURGERY | Age: 73
Setting detail: OPHTHALMOLOGY
End: 2023-10-25
Payer: MEDICARE

## 2023-10-25 DIAGNOSIS — H57.03: ICD-10-CM

## 2023-10-25 DIAGNOSIS — H25.12: ICD-10-CM

## 2023-10-25 DIAGNOSIS — H52.212: ICD-10-CM

## 2023-10-25 PROCEDURE — FEMTO FEMTOSECOND LASER: Mod: GY | Performed by: OPHTHALMOLOGY

## 2023-10-25 PROCEDURE — 66982 XCAPSL CTRC RMVL CPLX WO ECP: CPT | Mod: 79,LT | Performed by: OPHTHALMOLOGY

## 2023-10-25 PROCEDURE — A9270 NON-COVERED ITEM OR SERVICE: HCPCS | Mod: GY | Performed by: OPHTHALMOLOGY

## 2023-10-26 ENCOUNTER — OFFICE (OUTPATIENT)
Dept: URBAN - METROPOLITAN AREA CLINIC 1 | Facility: CLINIC | Age: 73
Setting detail: OPHTHALMOLOGY
End: 2023-10-26
Payer: MEDICARE

## 2023-10-26 ENCOUNTER — RX ONLY (RX ONLY)
Age: 73
End: 2023-10-26

## 2023-10-26 DIAGNOSIS — H11.32: ICD-10-CM

## 2023-10-26 DIAGNOSIS — Z96.1: ICD-10-CM

## 2023-10-26 PROCEDURE — 99024 POSTOP FOLLOW-UP VISIT: CPT | Performed by: OPHTHALMOLOGY

## 2023-10-26 ASSESSMENT — VISUAL ACUITY
OS_BCVA: 20/20
OD_BCVA: 20/20-

## 2023-10-26 ASSESSMENT — AXIALLENGTH_DERIVED
OS_AL: 21.8151
OS_AL: 21.8151
OD_AL: 21.4134
OD_AL: 22.2039

## 2023-10-26 ASSESSMENT — REFRACTION_CURRENTRX
OD_ADD: +3.00
OS_ADD: +3.00
OD_VPRISM_DIRECTION: PROGS
OD_OVR_VA: 20/
OD_SPHERE: +2.00
OS_CYLINDER: 0.00
OS_VPRISM_DIRECTION: PROGS
OS_AXIS: 167
OD_CYLINDER: -0.25
OD_AXIS: 088
OS_VPRISM_DIRECTION: PROGS
OS_OVR_VA: 20/
OS_ADD: +2.50
OD_VPRISM_DIRECTION: PROGS
OS_SPHERE: +1.25
OD_AXIS: 023
OS_OVR_VA: 20/
OS_CYLINDER: +0.25
OD_OVR_VA: 20/
OD_ADD: +2.50
OD_SPHERE: +0.75
OS_SPHERE: +0.25
OD_CYLINDER: +0.25
OS_AXIS: 000

## 2023-10-26 ASSESSMENT — REFRACTION_MANIFEST
OD_VA1: 20/25-
OD_AXIS: 110
OS_VA1: 20/25+
OS_ADD: +2.50
OD_ADD: +2.50
OS_SPHERE: +2.00
OD_SPHERE: +3.00
OD_CYLINDER: -0.75
OS_AXIS: 50
OS_CYLINDER: -0.50

## 2023-10-26 ASSESSMENT — LID EXAM ASSESSMENTS
OD_COMMENTS: 1+ AR
OS_BLEPHARITIS: T
OD_BLEPHARITIS: T
OS_MEIBOMITIS: LLL
OS_COMMENTS: 1+ AR

## 2023-10-26 ASSESSMENT — REFRACTION_AUTOREFRACTION
OS_SPHERE: +2.00
OD_CYLINDER: -1.50
OD_AXIS: 88
OS_AXIS: 041
OD_SPHERE: +1.00
OS_CYLINDER: -0.50

## 2023-10-26 ASSESSMENT — LID POSITION - DERMATOCHALASIS
OS_DERMATOCHALASIS: 1+
OD_DERMATOCHALASIS: 1+

## 2023-10-26 ASSESSMENT — CONFRONTATIONAL VISUAL FIELD TEST (CVF)
OS_FINDINGS: FULL
OD_FINDINGS: FULL

## 2023-10-26 ASSESSMENT — PACHYMETRY
OD_CT_CORRECTION: -1
OS_CT_CORRECTION: 0
OS_CT_UM: 542
OD_CT_UM: 551

## 2023-10-26 ASSESSMENT — KERATOMETRY
METHOD_AUTO_MANUAL: AUTO
OD_AXISANGLE_DEGREES: 156
OD_K1POWER_DIOPTERS: 47.00
OS_K1POWER_DIOPTERS: 46.75
OS_K2POWER_DIOPTERS: 47.00
OD_K2POWER_DIOPTERS: 47.50
OS_AXISANGLE_DEGREES: 174

## 2023-10-26 ASSESSMENT — TONOMETRY
OS_IOP_MMHG: 12
OD_IOP_MMHG: 12

## 2023-10-26 ASSESSMENT — SPHEQUIV_DERIVED
OS_SPHEQUIV: 1.75
OD_SPHEQUIV: 2.625
OD_SPHEQUIV: 0.25
OS_SPHEQUIV: 1.75

## 2023-10-26 ASSESSMENT — DECREASING TEAR LAKE - SEVERITY SCORE
OD_DEC_TEARLAKE: T
OS_DEC_TEARLAKE: T

## 2023-11-03 ENCOUNTER — OFFICE (OUTPATIENT)
Dept: URBAN - METROPOLITAN AREA CLINIC 1 | Facility: CLINIC | Age: 73
Setting detail: OPHTHALMOLOGY
End: 2023-11-03
Payer: MEDICARE

## 2023-11-03 DIAGNOSIS — Z96.1: ICD-10-CM

## 2023-11-03 PROBLEM — H01.001 BLEPHARITIS; RIGHT UPPER LID, LEFT UPPER LID: Status: ACTIVE | Noted: 2023-10-26

## 2023-11-03 PROBLEM — H11.32 SUB-CONJUNCTIVAL HEMORRHAGE; LEFT EYE: Status: RESOLVED | Noted: 2023-10-26 | Resolved: 2023-11-03

## 2023-11-03 PROBLEM — H01.004 BLEPHARITIS; RIGHT UPPER LID, LEFT UPPER LID: Status: ACTIVE | Noted: 2023-10-26

## 2023-11-03 PROCEDURE — 99024 POSTOP FOLLOW-UP VISIT: CPT

## 2023-11-03 ASSESSMENT — REFRACTION_CURRENTRX
OS_AXIS: 000
OS_OVR_VA: 20/
OD_AXIS: 023
OD_CYLINDER: +0.25
OS_VPRISM_DIRECTION: PROGS
OS_ADD: +2.50
OS_VPRISM_DIRECTION: PROGS
OD_SPHERE: +0.75
OS_SPHERE: +0.25
OD_VPRISM_DIRECTION: PROGS
OD_ADD: +3.00
OD_VPRISM_DIRECTION: PROGS
OS_AXIS: 167
OD_CYLINDER: -0.25
OD_SPHERE: +2.00
OS_OVR_VA: 20/
OD_OVR_VA: 20/
OS_CYLINDER: 0.00
OS_SPHERE: +1.25
OD_OVR_VA: 20/
OS_CYLINDER: +0.25
OD_AXIS: 088
OS_ADD: +3.00
OD_ADD: +2.50

## 2023-11-03 ASSESSMENT — LID POSITION - DERMATOCHALASIS
OS_DERMATOCHALASIS: 1+
OD_DERMATOCHALASIS: 1+

## 2023-11-03 ASSESSMENT — REFRACTION_MANIFEST
OD_VA1: 20/25-
OS_SPHERE: +2.00
OD_AXIS: 110
OS_AXIS: 50
OD_SPHERE: +3.00
OS_ADD: +2.50
OS_CYLINDER: -0.50
OS_VA1: 20/25+
OD_ADD: +2.50
OD_CYLINDER: -0.75

## 2023-11-03 ASSESSMENT — DECREASING TEAR LAKE - SEVERITY SCORE
OS_DEC_TEARLAKE: T
OD_DEC_TEARLAKE: T

## 2023-11-03 ASSESSMENT — CONFRONTATIONAL VISUAL FIELD TEST (CVF)
OD_FINDINGS: FULL
OS_FINDINGS: FULL

## 2023-11-03 ASSESSMENT — SPHEQUIV_DERIVED
OS_SPHEQUIV: -0.375
OS_SPHEQUIV: 1.75
OD_SPHEQUIV: 2.625
OD_SPHEQUIV: 0.25

## 2023-11-03 ASSESSMENT — LID EXAM ASSESSMENTS
OS_BLEPHARITIS: T
OD_BLEPHARITIS: T
OS_MEIBOMITIS: LLL
OD_COMMENTS: 1+ AR
OS_COMMENTS: 1+ AR

## 2023-11-03 ASSESSMENT — REFRACTION_AUTOREFRACTION
OD_CYLINDER: -1.50
OD_AXIS: 095
OS_AXIS: 080
OD_SPHERE: +1.00
OS_SPHERE: +0.25
OS_CYLINDER: -1.25

## 2023-11-07 ENCOUNTER — OFFICE (OUTPATIENT)
Dept: URBAN - METROPOLITAN AREA CLINIC 38 | Facility: CLINIC | Age: 73
Setting detail: OPHTHALMOLOGY
End: 2023-11-07

## 2023-11-07 DIAGNOSIS — H90.3: ICD-10-CM

## 2023-11-07 PROCEDURE — 92593 HEARING AID CHECK; BINAURAL: CPT | Performed by: AUDIOLOGIST

## 2023-12-01 ENCOUNTER — OFFICE (OUTPATIENT)
Dept: URBAN - METROPOLITAN AREA CLINIC 1 | Facility: CLINIC | Age: 73
Setting detail: OPHTHALMOLOGY
End: 2023-12-01
Payer: MEDICARE

## 2023-12-01 DIAGNOSIS — Z96.1: ICD-10-CM

## 2023-12-01 DIAGNOSIS — H16.223: ICD-10-CM

## 2023-12-01 PROCEDURE — 99024 POSTOP FOLLOW-UP VISIT: CPT

## 2023-12-01 ASSESSMENT — REFRACTION_AUTOREFRACTION
OS_SPHERE: +0.25
OD_CYLINDER: -1.25
OS_CYLINDER: -1.00
OS_AXIS: 080
OD_SPHERE: +0.75
OD_AXIS: 100

## 2023-12-01 ASSESSMENT — REFRACTION_CURRENTRX
OS_ADD: +2.50
OS_AXIS: 167
OD_VPRISM_DIRECTION: PROGS
OD_AXIS: 088
OD_OVR_VA: 20/
OS_VPRISM_DIRECTION: PROGS
OD_OVR_VA: 20/
OD_AXIS: 023
OD_ADD: +3.00
OS_OVR_VA: 20/
OS_ADD: +3.00
OD_CYLINDER: -0.25
OD_SPHERE: +2.00
OS_CYLINDER: 0.00
OD_ADD: +2.50
OD_CYLINDER: +0.25
OS_VPRISM_DIRECTION: PROGS
OS_OVR_VA: 20/
OS_AXIS: 000
OD_SPHERE: +0.75
OS_CYLINDER: +0.25
OS_SPHERE: +0.25
OS_SPHERE: +1.25
OD_VPRISM_DIRECTION: PROGS

## 2023-12-01 ASSESSMENT — REFRACTION_MANIFEST
OD_SPHERE: PLANO
OS_SPHERE: PLANO
OS_VA1: 20/20
OD_AXIS: 95
OD_VA1: 20/20
OD_ADD: +2.50
OS_AXIS: 85
OD_CYLINDER: -0.75
OS_ADD: +2.50
OS_CYLINDER: -0.75

## 2023-12-01 ASSESSMENT — SPHEQUIV_DERIVED
OD_SPHEQUIV: 0.125
OS_SPHEQUIV: -0.25

## 2023-12-01 ASSESSMENT — LID EXAM ASSESSMENTS
OS_MEIBOMITIS: LLL
OS_COMMENTS: 1+ AR
OD_BLEPHARITIS: T
OD_COMMENTS: 1+ AR
OS_BLEPHARITIS: T

## 2023-12-01 ASSESSMENT — SUPERFICIAL PUNCTATE KERATITIS (SPK)
OD_SPK: T
OS_SPK: T

## 2023-12-01 ASSESSMENT — LID POSITION - DERMATOCHALASIS
OD_DERMATOCHALASIS: 1+
OS_DERMATOCHALASIS: 1+

## 2023-12-01 ASSESSMENT — CONFRONTATIONAL VISUAL FIELD TEST (CVF)
OD_FINDINGS: FULL
OS_FINDINGS: FULL

## 2023-12-05 ENCOUNTER — OUTPATIENT (OUTPATIENT)
Dept: OUTPATIENT SERVICES | Facility: HOSPITAL | Age: 73
LOS: 1 days | Discharge: HOME | End: 2023-12-05

## 2023-12-05 DIAGNOSIS — Z98.890 OTHER SPECIFIED POSTPROCEDURAL STATES: Chronic | ICD-10-CM

## 2023-12-05 DIAGNOSIS — D50.9 IRON DEFICIENCY ANEMIA, UNSPECIFIED: ICD-10-CM

## 2023-12-05 DIAGNOSIS — C50.919 MALIGNANT NEOPLASM OF UNSPECIFIED SITE OF UNSPECIFIED FEMALE BREAST: ICD-10-CM

## 2023-12-12 ENCOUNTER — RESULT REVIEW (OUTPATIENT)
Age: 73
End: 2023-12-12

## 2023-12-12 ENCOUNTER — APPOINTMENT (OUTPATIENT)
Dept: HEMATOLOGY ONCOLOGY | Facility: CLINIC | Age: 73
End: 2023-12-12
Payer: MEDICARE

## 2023-12-12 VITALS
HEIGHT: 62 IN | SYSTOLIC BLOOD PRESSURE: 116 MMHG | HEART RATE: 74 BPM | TEMPERATURE: 98.5 F | DIASTOLIC BLOOD PRESSURE: 82 MMHG | OXYGEN SATURATION: 100 % | BODY MASS INDEX: 25.08 KG/M2 | WEIGHT: 136.31 LBS

## 2023-12-12 LAB
BASOPHILS # BLD AUTO: 0.1 K/UL — SIGNIFICANT CHANGE UP (ref 0–0.2)
BASOPHILS # BLD AUTO: 0.1 K/UL — SIGNIFICANT CHANGE UP (ref 0–0.2)
BASOPHILS NFR BLD AUTO: 1.5 % — SIGNIFICANT CHANGE UP (ref 0–2)
BASOPHILS NFR BLD AUTO: 1.5 % — SIGNIFICANT CHANGE UP (ref 0–2)
EOSINOPHIL # BLD AUTO: 0.3 K/UL — SIGNIFICANT CHANGE UP (ref 0–0.5)
EOSINOPHIL # BLD AUTO: 0.3 K/UL — SIGNIFICANT CHANGE UP (ref 0–0.5)
EOSINOPHIL NFR BLD AUTO: 3.9 % — SIGNIFICANT CHANGE UP (ref 0–6)
EOSINOPHIL NFR BLD AUTO: 3.9 % — SIGNIFICANT CHANGE UP (ref 0–6)
HCT VFR BLD CALC: 41.7 % — SIGNIFICANT CHANGE UP (ref 34.5–45)
HCT VFR BLD CALC: 41.7 % — SIGNIFICANT CHANGE UP (ref 34.5–45)
HGB BLD-MCNC: 13.5 G/DL — SIGNIFICANT CHANGE UP (ref 11.5–15.5)
HGB BLD-MCNC: 13.5 G/DL — SIGNIFICANT CHANGE UP (ref 11.5–15.5)
LYMPHOCYTES # BLD AUTO: 1.8 K/UL — SIGNIFICANT CHANGE UP (ref 1–3.3)
LYMPHOCYTES # BLD AUTO: 1.8 K/UL — SIGNIFICANT CHANGE UP (ref 1–3.3)
LYMPHOCYTES # BLD AUTO: 25 % — SIGNIFICANT CHANGE UP (ref 13–44)
LYMPHOCYTES # BLD AUTO: 25 % — SIGNIFICANT CHANGE UP (ref 13–44)
MCHC RBC-ENTMCNC: 32.3 G/DL — SIGNIFICANT CHANGE UP (ref 32–36)
MCHC RBC-ENTMCNC: 32.3 G/DL — SIGNIFICANT CHANGE UP (ref 32–36)
MCHC RBC-ENTMCNC: 32.6 PG — SIGNIFICANT CHANGE UP (ref 27–34)
MCHC RBC-ENTMCNC: 32.6 PG — SIGNIFICANT CHANGE UP (ref 27–34)
MCV RBC AUTO: 101 FL — HIGH (ref 80–100)
MCV RBC AUTO: 101 FL — HIGH (ref 80–100)
MONOCYTES # BLD AUTO: 0.4 K/UL — SIGNIFICANT CHANGE UP (ref 0–0.9)
MONOCYTES # BLD AUTO: 0.4 K/UL — SIGNIFICANT CHANGE UP (ref 0–0.9)
MONOCYTES NFR BLD AUTO: 5.8 % — SIGNIFICANT CHANGE UP (ref 2–14)
MONOCYTES NFR BLD AUTO: 5.8 % — SIGNIFICANT CHANGE UP (ref 2–14)
NEUTROPHILS # BLD AUTO: 4.5 K/UL — SIGNIFICANT CHANGE UP (ref 1.8–7.4)
NEUTROPHILS # BLD AUTO: 4.5 K/UL — SIGNIFICANT CHANGE UP (ref 1.8–7.4)
NEUTROPHILS NFR BLD AUTO: 63.8 % — SIGNIFICANT CHANGE UP (ref 43–77)
NEUTROPHILS NFR BLD AUTO: 63.8 % — SIGNIFICANT CHANGE UP (ref 43–77)
PLATELET # BLD AUTO: 269 K/UL — SIGNIFICANT CHANGE UP (ref 150–400)
PLATELET # BLD AUTO: 269 K/UL — SIGNIFICANT CHANGE UP (ref 150–400)
RBC # BLD: 4.13 M/UL — SIGNIFICANT CHANGE UP (ref 3.8–5.2)
RBC # BLD: 4.13 M/UL — SIGNIFICANT CHANGE UP (ref 3.8–5.2)
RBC # FLD: 11.2 % — SIGNIFICANT CHANGE UP (ref 10.3–14.5)
RBC # FLD: 11.2 % — SIGNIFICANT CHANGE UP (ref 10.3–14.5)
WBC # BLD: 7.1 K/UL — SIGNIFICANT CHANGE UP (ref 3.8–10.5)
WBC # BLD: 7.1 K/UL — SIGNIFICANT CHANGE UP (ref 3.8–10.5)
WBC # FLD AUTO: 7.1 K/UL — SIGNIFICANT CHANGE UP (ref 3.8–10.5)
WBC # FLD AUTO: 7.1 K/UL — SIGNIFICANT CHANGE UP (ref 3.8–10.5)

## 2023-12-12 PROCEDURE — 99214 OFFICE O/P EST MOD 30 MIN: CPT

## 2023-12-12 RX ORDER — NYSTATIN 100000 [USP'U]/G
100000 POWDER TOPICAL
Qty: 1 | Refills: 2 | Status: ACTIVE | COMMUNITY
Start: 2023-12-12 | End: 1900-01-01

## 2023-12-15 LAB
ALBUMIN SERPL ELPH-MCNC: 4.4 G/DL
ALP BLD-CCNC: 67 U/L
ALT SERPL-CCNC: 16 U/L
ANION GAP SERPL CALC-SCNC: 10 MMOL/L
AST SERPL-CCNC: 17 U/L
BILIRUB SERPL-MCNC: 0.3 MG/DL
BUN SERPL-MCNC: 20 MG/DL
CALCIUM SERPL-MCNC: 9.7 MG/DL
CHLORIDE SERPL-SCNC: 103 MMOL/L
CO2 SERPL-SCNC: 28 MMOL/L
CREAT SERPL-MCNC: 0.85 MG/DL
EGFR: 73 ML/MIN/1.73M2
GLUCOSE SERPL-MCNC: 117 MG/DL
POTASSIUM SERPL-SCNC: 4.8 MMOL/L
PROT SERPL-MCNC: 6.7 G/DL
SODIUM SERPL-SCNC: 141 MMOL/L

## 2023-12-18 ENCOUNTER — OFFICE (OUTPATIENT)
Dept: URBAN - METROPOLITAN AREA CLINIC 38 | Facility: CLINIC | Age: 73
Setting detail: OPHTHALMOLOGY
End: 2023-12-18

## 2023-12-18 DIAGNOSIS — H90.3: ICD-10-CM

## 2023-12-18 PROCEDURE — V5261 HEARING AID, DIGIT, BIN, BTE: HCPCS | Performed by: AUDIOLOGIST

## 2023-12-21 ENCOUNTER — OFFICE (OUTPATIENT)
Dept: URBAN - METROPOLITAN AREA CLINIC 38 | Facility: CLINIC | Age: 73
Setting detail: OPHTHALMOLOGY
End: 2023-12-21

## 2023-12-21 DIAGNOSIS — H90.3: ICD-10-CM

## 2023-12-21 PROCEDURE — HAD HEARING AID DISPENSE: Performed by: AUDIOLOGIST

## 2024-01-04 ENCOUNTER — OFFICE (OUTPATIENT)
Dept: URBAN - METROPOLITAN AREA CLINIC 38 | Facility: CLINIC | Age: 74
Setting detail: OPHTHALMOLOGY
End: 2024-01-04

## 2024-01-04 DIAGNOSIS — H90.3: ICD-10-CM

## 2024-01-04 PROCEDURE — 92593 HEARING AID CHECK; BINAURAL: CPT | Performed by: AUDIOLOGIST

## 2024-01-22 ENCOUNTER — APPOINTMENT (OUTPATIENT)
Dept: DERMATOLOGY | Facility: CLINIC | Age: 74
End: 2024-01-22

## 2024-01-25 ENCOUNTER — APPOINTMENT (OUTPATIENT)
Dept: MRI IMAGING | Facility: CLINIC | Age: 74
End: 2024-01-25
Payer: MEDICARE

## 2024-01-25 ENCOUNTER — OUTPATIENT (OUTPATIENT)
Dept: OUTPATIENT SERVICES | Facility: HOSPITAL | Age: 74
LOS: 1 days | End: 2024-01-25
Payer: MEDICARE

## 2024-01-25 ENCOUNTER — RESULT REVIEW (OUTPATIENT)
Age: 74
End: 2024-01-25

## 2024-01-25 DIAGNOSIS — Z00.8 ENCOUNTER FOR OTHER GENERAL EXAMINATION: ICD-10-CM

## 2024-01-25 DIAGNOSIS — Z98.890 OTHER SPECIFIED POSTPROCEDURAL STATES: Chronic | ICD-10-CM

## 2024-01-25 PROCEDURE — C8908: CPT | Mod: MH

## 2024-01-25 PROCEDURE — 77049 MRI BREAST C-+ W/CAD BI: CPT | Mod: 26,MH

## 2024-01-25 PROCEDURE — A9585: CPT

## 2024-01-25 PROCEDURE — C8937: CPT

## 2024-02-26 ENCOUNTER — APPOINTMENT (OUTPATIENT)
Dept: OBGYN | Facility: CLINIC | Age: 74
End: 2024-02-26
Payer: MEDICARE

## 2024-02-26 VITALS
HEIGHT: 62 IN | SYSTOLIC BLOOD PRESSURE: 125 MMHG | WEIGHT: 133 LBS | DIASTOLIC BLOOD PRESSURE: 70 MMHG | BODY MASS INDEX: 24.48 KG/M2

## 2024-02-26 DIAGNOSIS — Z01.419 ENCOUNTER FOR GYNECOLOGICAL EXAMINATION (GENERAL) (ROUTINE) W/OUT ABNORMAL FINDINGS: ICD-10-CM

## 2024-02-26 PROCEDURE — 99397 PER PM REEVAL EST PAT 65+ YR: CPT | Mod: GY

## 2024-02-26 PROCEDURE — G0101: CPT

## 2024-02-26 RX ORDER — TRIAMCINOLONE ACETONIDE 0.5 MG/G
0.05 OINTMENT TOPICAL TWICE DAILY
Qty: 1 | Refills: 4 | Status: ACTIVE | COMMUNITY
Start: 2024-02-26 | End: 1900-01-01

## 2024-02-26 NOTE — PHYSICAL EXAM
[Chaperone Declined] : Patient declined chaperone [Appropriately responsive] : appropriately responsive [Alert] : alert [No Acute Distress] : no acute distress [Soft] : soft [No Lymphadenopathy] : no lymphadenopathy [Non-tender] : non-tender [Non-distended] : non-distended [No HSM] : No HSM [No Lesions] : no lesions [No Mass] : no mass [Oriented x3] : oriented x3 [Examination Of The Breasts] : a normal appearance [No Masses] : no breast masses were palpable [Labia Majora] : normal [Labia Minora] : normal [Atrophy] : atrophy [Normal] : normal [Uterine Adnexae] : normal [FreeTextEntry9] : deferred

## 2024-02-26 NOTE — HISTORY OF PRESENT ILLNESS
[FreeTextEntry1] : 72 yo here for av. She was dx with left breast cancer in March 2021, triple negative, BRCA1 and 2 negative. Had lumpectomy, chemo/radiation. Being followed by Dr. Dorsey.  Pt had + hpv, neg pap in 2020, 2021 pap had hpv negative, nml pap. Last year pt refused pap. She occ. has itching to vulva and uses triamcinolone cream .05% for relief-needs refill. She had colonoscopy 2023. Bone density 6 mo ago-is on boniva last bone desnity improved t score= -2.0 now-h/o osteoporosis  [Patient reported bone density results were abnormal] : Patient reported bone density results were abnormal [BoneDensityDate] : 2021 [ColonoscopyDate] : 2/2023

## 2024-02-29 LAB — CYTOLOGY CVX/VAG DOC THIN PREP: ABNORMAL

## 2024-04-10 ENCOUNTER — RX ONLY (RX ONLY)
Age: 74
End: 2024-04-10

## 2024-04-10 ENCOUNTER — OFFICE (OUTPATIENT)
Dept: URBAN - METROPOLITAN AREA CLINIC 1 | Facility: CLINIC | Age: 74
Setting detail: OPHTHALMOLOGY
End: 2024-04-10
Payer: MEDICARE

## 2024-04-10 DIAGNOSIS — H16.223: ICD-10-CM

## 2024-04-10 DIAGNOSIS — Z96.1: ICD-10-CM

## 2024-04-10 DIAGNOSIS — H01.004: ICD-10-CM

## 2024-04-10 DIAGNOSIS — Z17.1 MALIGNANT NEOPLASM OF LOWER-INNER QUADRANT OF LEFT FEMALE BREAST: ICD-10-CM

## 2024-04-10 DIAGNOSIS — H40.013: ICD-10-CM

## 2024-04-10 DIAGNOSIS — C50.312 MALIGNANT NEOPLASM OF LOWER-INNER QUADRANT OF LEFT FEMALE BREAST: ICD-10-CM

## 2024-04-10 DIAGNOSIS — H43.393: ICD-10-CM

## 2024-04-10 DIAGNOSIS — H01.001: ICD-10-CM

## 2024-04-10 DIAGNOSIS — H11.152: ICD-10-CM

## 2024-04-10 DIAGNOSIS — H11.823: ICD-10-CM

## 2024-04-10 PROCEDURE — 92014 COMPRE OPH EXAM EST PT 1/>: CPT | Performed by: OPHTHALMOLOGY

## 2024-04-10 PROCEDURE — 92133 CPTRZD OPH DX IMG PST SGM ON: CPT | Performed by: OPHTHALMOLOGY

## 2024-04-10 ASSESSMENT — LID EXAM ASSESSMENTS
OD_COMMENTS: 1+ AR
OS_COMMENTS: 1+ AR
OD_BLEPHARITIS: T
OS_BLEPHARITIS: T
OS_MEIBOMITIS: LLL

## 2024-04-10 ASSESSMENT — LID POSITION - DERMATOCHALASIS
OD_DERMATOCHALASIS: 1+
OS_DERMATOCHALASIS: 1+

## 2024-04-29 ENCOUNTER — OFFICE (OUTPATIENT)
Dept: URBAN - METROPOLITAN AREA CLINIC 38 | Facility: CLINIC | Age: 74
Setting detail: OPHTHALMOLOGY
End: 2024-04-29

## 2024-04-29 DIAGNOSIS — H90.3: ICD-10-CM

## 2024-04-29 PROCEDURE — 92593 HEARING AID CHECK; BINAURAL: CPT | Performed by: AUDIOLOGIST

## 2024-05-01 PROBLEM — H43.391 VITREOUS FLOATERS; RIGHT EYE: Status: ACTIVE | Noted: 2024-05-01

## 2024-05-28 ENCOUNTER — APPOINTMENT (OUTPATIENT)
Dept: MAMMOGRAPHY | Facility: CLINIC | Age: 74
End: 2024-05-28
Payer: MEDICARE

## 2024-05-28 ENCOUNTER — RESULT REVIEW (OUTPATIENT)
Age: 74
End: 2024-05-28

## 2024-05-28 ENCOUNTER — OUTPATIENT (OUTPATIENT)
Dept: OUTPATIENT SERVICES | Facility: HOSPITAL | Age: 74
LOS: 1 days | End: 2024-05-28
Payer: MEDICARE

## 2024-05-28 ENCOUNTER — APPOINTMENT (OUTPATIENT)
Dept: ULTRASOUND IMAGING | Facility: CLINIC | Age: 74
End: 2024-05-28
Payer: MEDICARE

## 2024-05-28 DIAGNOSIS — C50.312 MALIGNANT NEOPLASM OF LOWER-INNER QUADRANT OF LEFT FEMALE BREAST: ICD-10-CM

## 2024-05-28 DIAGNOSIS — Z98.890 OTHER SPECIFIED POSTPROCEDURAL STATES: Chronic | ICD-10-CM

## 2024-05-28 PROCEDURE — 77066 DX MAMMO INCL CAD BI: CPT | Mod: 26

## 2024-05-28 PROCEDURE — 76641 ULTRASOUND BREAST COMPLETE: CPT | Mod: 26,50,GA

## 2024-05-28 PROCEDURE — 77066 DX MAMMO INCL CAD BI: CPT

## 2024-05-28 PROCEDURE — G0279: CPT | Mod: 26

## 2024-05-28 PROCEDURE — 76641 ULTRASOUND BREAST COMPLETE: CPT

## 2024-05-28 PROCEDURE — G0279: CPT

## 2024-06-11 ENCOUNTER — RESULT REVIEW (OUTPATIENT)
Age: 74
End: 2024-06-11

## 2024-06-11 ENCOUNTER — APPOINTMENT (OUTPATIENT)
Dept: HEMATOLOGY ONCOLOGY | Facility: CLINIC | Age: 74
End: 2024-06-11
Payer: MEDICARE

## 2024-06-11 VITALS
TEMPERATURE: 97.3 F | DIASTOLIC BLOOD PRESSURE: 75 MMHG | HEART RATE: 75 BPM | WEIGHT: 135 LBS | HEIGHT: 62 IN | OXYGEN SATURATION: 97 % | SYSTOLIC BLOOD PRESSURE: 121 MMHG | BODY MASS INDEX: 24.84 KG/M2

## 2024-06-11 DIAGNOSIS — C50.912 MALIGNANT NEOPLASM OF UNSPECIFIED SITE OF LEFT FEMALE BREAST: ICD-10-CM

## 2024-06-11 DIAGNOSIS — B37.9 CANDIDIASIS, UNSPECIFIED: ICD-10-CM

## 2024-06-11 DIAGNOSIS — Z08 ENCOUNTER FOR FOLLOW-UP EXAMINATION AFTER COMPLETED TREATMENT FOR MALIGNANT NEOPLASM: ICD-10-CM

## 2024-06-11 DIAGNOSIS — Z85.3 ENCOUNTER FOR FOLLOW-UP EXAMINATION AFTER COMPLETED TREATMENT FOR MALIGNANT NEOPLASM: ICD-10-CM

## 2024-06-11 PROCEDURE — 99214 OFFICE O/P EST MOD 30 MIN: CPT

## 2024-06-11 RX ORDER — MULTIVITAMIN,THER AND MINERALS
TABLET ORAL
Refills: 0 | Status: ACTIVE | COMMUNITY

## 2024-06-11 NOTE — PHYSICAL EXAM
[Normal] : no peripheral adenopathy appreciated [de-identified] : left breast flat nipple, with dimple at the lumpectomy site 0900, No palpable breast mass or axillary adenopathy bilaterally

## 2024-06-11 NOTE — ASSESSMENT
[FreeTextEntry1] : 72 y/o postmenopausal female with stage IA triple negative breast cancer, pT1cN0. Underwent left breast savita  wide lumpectomy on 06/28/21. Completed 4 cycles of Taxotere / Cytoxan on 10/8/21. Completed adjuvant RT with Dr. Martin Conti of [facility] on 12/1/21. 4/20/21 [facility] - gene panel negative.   05/26/2023 Bilateral mammo/sono- Stable postlumpectomy changes in the left breast. No mammographic or sonographic evidence of malignancy. 1/25/24 MR sarah. Breast-ELENITA Bi-Rads 2 5/28/24 Sarah. Diagnostic mammo/sono-No mammographic or sonographic evidence of malignancy Bi-Rads 2  CBE-ELENITA  Plan: -Continue follow up with breast surgeon-upcoming appointment with Dr. Dorsey on 6/27/24 Next mammo/sono in 5/2025 -Continue age-appropriate preventative screening and f/u with PCP (last seen in Jan 24) -Blood work today-CBC, CMP (no blood work done recently, patient preferred to do it today) RTO 6 months

## 2024-06-11 NOTE — HISTORY OF PRESENT ILLNESS
[de-identified] : Ms. Gurber is a 70 year old female newly diagnosed with triple negative left breast cancer at age 70. She recently underwent bilateral screening mammogram 3/18/2021 which demonstrated a new mammographic mass at the 9:00 position of the left breast which was biopsied on 2021 and demonstrated poorly differentiated invasive carcinoma ER- IL- [id number] -\par  \par  2021 Bilateral screening mammogram \par  Left breast nodule\par  Left breast sonography is recommended: BI-RADS-0: Incomplete: Needs Additional Imaging evaluation\par  \par  2021 DEXA \par  Osteopetrosis category with the lowest T score -2/9 within the lumbar spine\par  \par  2021 Breast ultrasound Left Complete\par  9:00 5cm from the nipple, corresponding to the new mammographic mass there is a hypoechoic mass with irregular borders measuring 9 X 8 X 2mm cyst.\par  Ultrasound- guided core biopsy is recommended\par  There is a left axillary lymph node questionable mildly thickened cortex. Patient recently had a COVID vaccine and this may be reactive \par  BI-RADS-4: Suspicious Findings 4-C high suspicion for malignancy \par  \par  2021 Underwent Breast, left, 9 o'clock 5 cm FN, core biopsy\par  - Invasive poorly differentiated ductal carcinoma\par  - Farhad score 8/9 (3 + 3 + 2) in this limited material\par  - Invasive tumor measures at least 0.7 cm\par  - Ductal carcinoma in situ absent\par  - Microcalcifications absent\par  - Lymphovascular permeation by tumor not seen\par  - As per outside pathology report:\par  ER:  Negative 0% nuclear staining\par  IL:  Negative 0% nuclear staining\par  Her-2:  Negative (0 membrane staining)\par  \par  2021 MR Breast w/wo IV Cont, Bilateral w/CAD \par  1.0 cm malignancy in the central left breast.\par  No MRI evidence of multicentric or contralateral disease.  Study limited by moderate background enhancement.\par  Nonspecific prominent left axillary lymph nodes which may be reactive in the setting of recent Covid vaccination.  Left axillary ultrasound evaluation and sampling of the most suspicious lymph node can be performed if requested.\par  RECOMMENDATION:  Surgical or oncologic management. BI-RADS 6- Known Biopsy-Proven Malignancy\par  \par  2021 US Axilla Only, Left             \par  FINDINGS: No suspicious axillary lymph nodes are noted. A nonspecific benign-appearing 1.9 x 0.6 x 1.2 cm axillary lymph node is identified.\par  IMPRESSION:No sonographically suspicious axillary lymph nodes.\par  Surgical management of the known left breast cancer remains recommended.\par  RECOMMENDATION: Surgical or oncologic management.BI-RADS 2 - Benign Finding(s)\par  \par  21 Underwent left breast savita  wide lumpectomy with sentinel node biopsy.\par  Surgical Final Report\par  Final Diagnosis\par  1     Left sentinel lymph node #2 blue:\par  - One lymph node, negative for metastatic carcinoma (0/1).\par  2     Left sentinel lymph node #1:\par  - Two lymph node, negative for metastatic carcinoma (0/2).\par  3     Left breast savita wide lumpectomy:\par  -        Invasive poorly differentiated mammary ductal carcinoma\par  with apocrine features, Spencer score 8/9 (3 + 3 + 2),\par  adjacent to previous biopsy cavity.\par  -       The  invasive tumor measures measuring 1.3 cm, one focus.\par  -       Ductal carcinoma in situ, cribriform, low to\par  intermediate nuclear grade, without calcifications or necrosis.\par  -       All surgical resection margins are negative, the\par  tumor is located 0.15 cm from anterior aspect of the resection,\par  the closest margin; see also the the following resection\par  margins(4-9).\par  4     Left breast deep posterior margin:\par  - Breast tissue, negative for dysplasia/neoplasia.\par  5     Left breast anterior margin:\par  - Breast tissue, negative for dysplasia/neoplasia.\par  6     Left breast lateral margin:\par  - Breast tissue, negative for dysplasia/neoplasia.\par  7     Left breast superior margin:\par  - Breast tissue, negative for dysplasia/neoplasia. Minute\par  focus of radial scar, completely excised.\par  8     Left breast inferior margin;\par  - Breast tissue, negative for dysplasia/neoplasia.\par  9     Left breast medial margin;\par  - Breast tissue, negative for dysplasia/neoplasia.\par  \par  Final Diagnosis\par  Left breast, Lumpectomy ( one slide) 1 slide, [id number], HE3E)\par  - Invasive poorly differentiated ductal carcinoma with apocrine\par  features ( see comment for further details)\par  - As per report tumor is triple negative ( ER, IL and Her-2\par  Negative)\par  Comment :\par  Only one slide is provided for review. Margin of the specimen in\par  this one slide is negative for carcinoma. 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 \par  Previously was evaluated by [facility] in [place], planning for RT in the future at that site due to vicinity to home \par  Former smoker, retired\par  \par  21 [facility] Genetic Result: Negative: No clinically significant mutation identified \par  PMH: Osteoporosis, Hyperlipidemia \par  SH: Bilateral tubal ligation, right ear surgical procedure () \par  FH: Mother: stomach cancer; twin sister: esophageal cancer,   [de-identified] : She returns for follow up visit today Reports recent left knee meniscus tear and effusion, s/p drain and cortisone injection, doIng better now Takes Multi vitamin daily She had EGD scopy and colonoscopy last year Reports intentional weight loss of 10 lb Denies recent infection or illness Denies pain, headaches, dizziness, fever, chills, chest pain, sob, abdominal pain, nausea/vomiting, diarrhea, constipation, urinary symptoms

## 2024-06-12 LAB
ALBUMIN SERPL ELPH-MCNC: 4.3 G/DL
ALP BLD-CCNC: 61 U/L
ALT SERPL-CCNC: 25 U/L
ANION GAP SERPL CALC-SCNC: 11 MMOL/L
AST SERPL-CCNC: 18 U/L
BILIRUB SERPL-MCNC: 0.2 MG/DL
BUN SERPL-MCNC: 20 MG/DL
CALCIUM SERPL-MCNC: 10.1 MG/DL
CHLORIDE SERPL-SCNC: 105 MMOL/L
CO2 SERPL-SCNC: 26 MMOL/L
CREAT SERPL-MCNC: 0.95 MG/DL
EGFR: 64 ML/MIN/1.73M2
GLUCOSE SERPL-MCNC: 91 MG/DL
POTASSIUM SERPL-SCNC: 4.6 MMOL/L
PROT SERPL-MCNC: 6.8 G/DL
SODIUM SERPL-SCNC: 143 MMOL/L

## 2024-07-23 ENCOUNTER — OFFICE (OUTPATIENT)
Dept: URBAN - METROPOLITAN AREA CLINIC 105 | Facility: CLINIC | Age: 74
Setting detail: OPHTHALMOLOGY
End: 2024-07-23
Payer: MEDICARE

## 2024-07-23 DIAGNOSIS — H16.223: ICD-10-CM

## 2024-07-23 DIAGNOSIS — H02.89: ICD-10-CM

## 2024-07-23 PROBLEM — H02.83 DERMATOCHALASIS ; BOTH EYES: Status: ACTIVE | Noted: 2024-07-23

## 2024-07-23 PROCEDURE — 99213 OFFICE O/P EST LOW 20 MIN: CPT | Performed by: OPTOMETRIST

## 2024-07-23 ASSESSMENT — LID EXAM ASSESSMENTS
OD_MEIBOMITIS: RLL RUL 2+
OS_MEIBOMITIS: LLL LUL 2+

## 2024-07-23 ASSESSMENT — CONFRONTATIONAL VISUAL FIELD TEST (CVF)
OD_FINDINGS: FULL
OS_FINDINGS: FULL

## 2024-07-23 ASSESSMENT — LID POSITION - DERMATOCHALASIS
OS_DERMATOCHALASIS: 1+
OD_DERMATOCHALASIS: 1+

## 2024-08-07 ENCOUNTER — OFFICE (OUTPATIENT)
Dept: URBAN - METROPOLITAN AREA CLINIC 105 | Facility: CLINIC | Age: 74
Setting detail: OPHTHALMOLOGY
End: 2024-08-07

## 2024-08-07 DIAGNOSIS — H90.3: ICD-10-CM

## 2024-08-07 PROCEDURE — 92593 HEARING AID CHECK; BINAURAL: CPT | Performed by: AUDIOLOGIST-HEARING AID FITTER

## 2024-09-09 ENCOUNTER — RX ONLY (RX ONLY)
Age: 74
End: 2024-09-09

## 2024-09-09 ENCOUNTER — OFFICE (OUTPATIENT)
Dept: URBAN - METROPOLITAN AREA CLINIC 1 | Facility: CLINIC | Age: 74
Setting detail: OPHTHALMOLOGY
End: 2024-09-09
Payer: MEDICARE

## 2024-09-09 DIAGNOSIS — H40.013: ICD-10-CM

## 2024-09-09 DIAGNOSIS — H11.152: ICD-10-CM

## 2024-09-09 DIAGNOSIS — H43.393: ICD-10-CM

## 2024-09-09 DIAGNOSIS — H11.823: ICD-10-CM

## 2024-09-09 DIAGNOSIS — H11.31: ICD-10-CM

## 2024-09-09 DIAGNOSIS — H16.223: ICD-10-CM

## 2024-09-09 DIAGNOSIS — Z96.1: ICD-10-CM

## 2024-09-09 PROCEDURE — 92014 COMPRE OPH EXAM EST PT 1/>: CPT | Performed by: OPHTHALMOLOGY

## 2024-09-09 PROCEDURE — 92250 FUNDUS PHOTOGRAPHY W/I&R: CPT | Performed by: OPHTHALMOLOGY

## 2024-09-09 PROCEDURE — 92083 EXTENDED VISUAL FIELD XM: CPT | Performed by: OPHTHALMOLOGY

## 2024-09-09 ASSESSMENT — LID EXAM ASSESSMENTS
OS_MEIBOMITIS: LLL LUL 2+
OD_MEIBOMITIS: RLL RUL 2+

## 2024-09-09 ASSESSMENT — CONFRONTATIONAL VISUAL FIELD TEST (CVF)
OD_FINDINGS: FULL
OS_FINDINGS: FULL

## 2024-09-09 ASSESSMENT — LID POSITION - DERMATOCHALASIS
OD_DERMATOCHALASIS: 1+
OS_DERMATOCHALASIS: 1+

## 2024-10-24 ENCOUNTER — APPOINTMENT (OUTPATIENT)
Dept: ORTHOPEDIC SURGERY | Facility: CLINIC | Age: 74
End: 2024-10-24
Payer: MEDICARE

## 2024-10-24 VITALS
DIASTOLIC BLOOD PRESSURE: 75 MMHG | HEIGHT: 62 IN | WEIGHT: 135 LBS | BODY MASS INDEX: 24.84 KG/M2 | SYSTOLIC BLOOD PRESSURE: 126 MMHG

## 2024-10-24 DIAGNOSIS — S83.282A OTHER TEAR OF LATERAL MENISCUS, CURRENT INJURY, LEFT KNEE, INITIAL ENCOUNTER: ICD-10-CM

## 2024-10-24 PROCEDURE — 99204 OFFICE O/P NEW MOD 45 MIN: CPT

## 2024-10-24 PROCEDURE — G2211 COMPLEX E/M VISIT ADD ON: CPT

## 2024-10-28 ENCOUNTER — OFFICE (OUTPATIENT)
Dept: URBAN - METROPOLITAN AREA CLINIC 38 | Facility: CLINIC | Age: 74
Setting detail: OPHTHALMOLOGY
End: 2024-10-28

## 2024-10-28 DIAGNOSIS — H90.3: ICD-10-CM

## 2024-10-28 PROCEDURE — 92593 HEARING AID CHECK; BINAURAL: CPT | Performed by: AUDIOLOGIST

## 2024-11-27 ENCOUNTER — OUTPATIENT (OUTPATIENT)
Dept: OUTPATIENT SERVICES | Facility: HOSPITAL | Age: 74
LOS: 1 days | Discharge: HOME | End: 2024-11-27

## 2024-11-27 DIAGNOSIS — Z98.890 OTHER SPECIFIED POSTPROCEDURAL STATES: Chronic | ICD-10-CM

## 2024-11-27 DIAGNOSIS — C50.919 MALIGNANT NEOPLASM OF UNSPECIFIED SITE OF UNSPECIFIED FEMALE BREAST: ICD-10-CM

## 2024-12-02 ENCOUNTER — APPOINTMENT (OUTPATIENT)
Dept: HEMATOLOGY ONCOLOGY | Facility: CLINIC | Age: 74
End: 2024-12-02
Payer: MEDICARE

## 2024-12-02 ENCOUNTER — NON-APPOINTMENT (OUTPATIENT)
Age: 74
End: 2024-12-02

## 2024-12-02 VITALS
SYSTOLIC BLOOD PRESSURE: 123 MMHG | WEIGHT: 136 LBS | HEART RATE: 84 BPM | OXYGEN SATURATION: 100 % | HEIGHT: 62 IN | BODY MASS INDEX: 25.03 KG/M2 | DIASTOLIC BLOOD PRESSURE: 82 MMHG

## 2024-12-02 DIAGNOSIS — C50.912 MALIGNANT NEOPLASM OF UNSPECIFIED SITE OF LEFT FEMALE BREAST: ICD-10-CM

## 2024-12-02 PROCEDURE — 99214 OFFICE O/P EST MOD 30 MIN: CPT

## 2024-12-02 PROCEDURE — G2211 COMPLEX E/M VISIT ADD ON: CPT

## 2025-01-03 ENCOUNTER — RESULT REVIEW (OUTPATIENT)
Age: 75
End: 2025-01-03

## 2025-01-03 ENCOUNTER — APPOINTMENT (OUTPATIENT)
Dept: MRI IMAGING | Facility: CLINIC | Age: 75
End: 2025-01-03
Payer: MEDICARE

## 2025-01-03 ENCOUNTER — OUTPATIENT (OUTPATIENT)
Dept: OUTPATIENT SERVICES | Facility: HOSPITAL | Age: 75
LOS: 1 days | End: 2025-01-03
Payer: MEDICARE

## 2025-01-03 DIAGNOSIS — Z00.00 ENCOUNTER FOR GENERAL ADULT MEDICAL EXAMINATION WITHOUT ABNORMAL FINDINGS: ICD-10-CM

## 2025-01-03 DIAGNOSIS — Z98.890 OTHER SPECIFIED POSTPROCEDURAL STATES: Chronic | ICD-10-CM

## 2025-01-03 PROCEDURE — C8908: CPT | Mod: MH

## 2025-01-03 PROCEDURE — A9585: CPT

## 2025-01-03 PROCEDURE — 77049 MRI BREAST C-+ W/CAD BI: CPT | Mod: 26,MH

## 2025-01-03 PROCEDURE — C8937: CPT

## 2025-03-20 ENCOUNTER — OFFICE (OUTPATIENT)
Dept: URBAN - METROPOLITAN AREA CLINIC 1 | Facility: CLINIC | Age: 75
Setting detail: OPHTHALMOLOGY
End: 2025-03-20
Payer: MEDICARE

## 2025-03-20 DIAGNOSIS — H01.001: ICD-10-CM

## 2025-03-20 DIAGNOSIS — H01.004: ICD-10-CM

## 2025-03-20 DIAGNOSIS — H11.152: ICD-10-CM

## 2025-03-20 DIAGNOSIS — H11.823: ICD-10-CM

## 2025-03-20 DIAGNOSIS — Z96.1: ICD-10-CM

## 2025-03-20 DIAGNOSIS — H02.834: ICD-10-CM

## 2025-03-20 DIAGNOSIS — H40.033: ICD-10-CM

## 2025-03-20 DIAGNOSIS — H16.223: ICD-10-CM

## 2025-03-20 DIAGNOSIS — H40.013: ICD-10-CM

## 2025-03-20 DIAGNOSIS — H02.831: ICD-10-CM

## 2025-03-20 PROCEDURE — 92083 EXTENDED VISUAL FIELD XM: CPT | Performed by: OPHTHALMOLOGY

## 2025-03-20 PROCEDURE — 92133 CPTRZD OPH DX IMG PST SGM ON: CPT | Performed by: OPHTHALMOLOGY

## 2025-03-20 PROCEDURE — 99213 OFFICE O/P EST LOW 20 MIN: CPT | Performed by: OPHTHALMOLOGY

## 2025-03-20 ASSESSMENT — REFRACTION_CURRENTRX
OD_SPHERE: +0.75
OS_SPHERE: +1.25
OD_AXIS: 088
OD_VPRISM_DIRECTION: PROGS
OS_CYLINDER: +0.25
OS_CYLINDER: 0.00
OS_SPHERE: +0.25
OS_VPRISM_DIRECTION: PROGS
OD_AXIS: 023
OD_ADD: +2.50
OS_ADD: +3.00
OD_SPHERE: +2.00
OD_OVR_VA: 20/
OS_AXIS: 167
OS_ADD: +2.50
OS_VPRISM_DIRECTION: PROGS
OS_OVR_VA: 20/
OD_ADD: +3.00
OD_VPRISM_DIRECTION: PROGS
OS_AXIS: 000
OD_CYLINDER: -0.25
OS_OVR_VA: 20/
OD_OVR_VA: 20/
OD_CYLINDER: +0.25

## 2025-03-20 ASSESSMENT — KERATOMETRY
OS_AXISANGLE_DEGREES: 159
OD_K1POWER_DIOPTERS: 47.00
OS_K1POWER_DIOPTERS: 46.75
OS_K2POWER_DIOPTERS: 47.25
OD_K2POWER_DIOPTERS: 47.25
METHOD_AUTO_MANUAL: AUTO
OD_AXISANGLE_DEGREES: 170

## 2025-03-20 ASSESSMENT — REFRACTION_MANIFEST
OD_AXIS: 95
OS_VA1: 20/20
OD_CYLINDER: -0.75
OS_ADD: +2.50
OD_ADD: +2.50
OS_CYLINDER: -0.75
OD_VA1: 20/20
OS_SPHERE: PLANO
OS_AXIS: 85
OD_SPHERE: PLANO

## 2025-03-20 ASSESSMENT — TONOMETRY
OS_IOP_MMHG: 16
OD_IOP_MMHG: 17

## 2025-03-20 ASSESSMENT — CONFRONTATIONAL VISUAL FIELD TEST (CVF)
OS_FINDINGS: FULL
OD_FINDINGS: FULL

## 2025-03-20 ASSESSMENT — REFRACTION_AUTOREFRACTION
OS_AXIS: 074
OD_SPHERE: +0.50
OS_SPHERE: +0.75
OD_AXIS: 092
OS_CYLINDER: -1.50
OD_CYLINDER: -1.25

## 2025-03-20 ASSESSMENT — LID EXAM ASSESSMENTS
OS_BLEPHARITIS: LUL
OD_BLEPHARITIS: RUL

## 2025-03-20 ASSESSMENT — PACHYMETRY
OS_CT_UM: 542
OD_CT_UM: 551
OD_CT_CORRECTION: -1
OS_CT_CORRECTION: 0

## 2025-03-20 ASSESSMENT — VISUAL ACUITY
OD_BCVA: 20/25-3
OS_BCVA: 20/25-1

## 2025-03-20 ASSESSMENT — SUPERFICIAL PUNCTATE KERATITIS (SPK)
OD_SPK: 2+
OS_SPK: 1+

## 2025-03-20 ASSESSMENT — LID POSITION - DERMATOCHALASIS
OD_DERMATOCHALASIS: RUL 2+
OS_DERMATOCHALASIS: LUL 2+

## 2025-05-07 ENCOUNTER — RESULT REVIEW (OUTPATIENT)
Age: 75
End: 2025-05-07

## 2025-05-07 ENCOUNTER — APPOINTMENT (OUTPATIENT)
Dept: MAMMOGRAPHY | Facility: CLINIC | Age: 75
End: 2025-05-07

## 2025-05-07 ENCOUNTER — OUTPATIENT (OUTPATIENT)
Dept: OUTPATIENT SERVICES | Facility: HOSPITAL | Age: 75
LOS: 1 days | End: 2025-05-07
Payer: MEDICARE

## 2025-05-07 ENCOUNTER — APPOINTMENT (OUTPATIENT)
Dept: ULTRASOUND IMAGING | Facility: CLINIC | Age: 75
End: 2025-05-07

## 2025-05-07 DIAGNOSIS — Z98.890 OTHER SPECIFIED POSTPROCEDURAL STATES: Chronic | ICD-10-CM

## 2025-05-07 DIAGNOSIS — Z85.3 PERSONAL HISTORY OF MALIGNANT NEOPLASM OF BREAST: ICD-10-CM

## 2025-05-07 PROCEDURE — G0279: CPT | Mod: 26

## 2025-05-07 PROCEDURE — 76641 ULTRASOUND BREAST COMPLETE: CPT | Mod: 26,50,GA

## 2025-05-07 PROCEDURE — 77066 DX MAMMO INCL CAD BI: CPT | Mod: 26

## 2025-05-07 PROCEDURE — G0279: CPT

## 2025-05-07 PROCEDURE — 77066 DX MAMMO INCL CAD BI: CPT

## 2025-05-07 PROCEDURE — 76641 ULTRASOUND BREAST COMPLETE: CPT

## 2025-05-15 ENCOUNTER — RESULT REVIEW (OUTPATIENT)
Age: 75
End: 2025-05-15

## 2025-05-15 ENCOUNTER — OUTPATIENT (OUTPATIENT)
Dept: OUTPATIENT SERVICES | Facility: HOSPITAL | Age: 75
LOS: 1 days | End: 2025-05-15
Payer: MEDICARE

## 2025-05-15 ENCOUNTER — APPOINTMENT (OUTPATIENT)
Dept: MAMMOGRAPHY | Facility: CLINIC | Age: 75
End: 2025-05-15
Payer: MEDICARE

## 2025-05-15 DIAGNOSIS — Z98.890 OTHER SPECIFIED POSTPROCEDURAL STATES: Chronic | ICD-10-CM

## 2025-05-15 DIAGNOSIS — N63.0 UNSPECIFIED LUMP IN UNSPECIFIED BREAST: ICD-10-CM

## 2025-05-15 PROCEDURE — A4648: CPT

## 2025-05-15 PROCEDURE — 88305 TISSUE EXAM BY PATHOLOGIST: CPT

## 2025-05-15 PROCEDURE — 19081 BX BREAST 1ST LESION STRTCTC: CPT

## 2025-05-15 PROCEDURE — 77065 DX MAMMO INCL CAD UNI: CPT

## 2025-05-15 PROCEDURE — 88305 TISSUE EXAM BY PATHOLOGIST: CPT | Mod: 26

## 2025-05-15 PROCEDURE — 76098 X-RAY EXAM SURGICAL SPECIMEN: CPT | Mod: 26

## 2025-05-15 PROCEDURE — 19081 BX BREAST 1ST LESION STRTCTC: CPT | Mod: LT

## 2025-05-15 PROCEDURE — 77065 DX MAMMO INCL CAD UNI: CPT | Mod: 26,LT

## 2025-05-21 LAB — SURGICAL PATHOLOGY STUDY: SIGNIFICANT CHANGE UP

## 2025-05-29 ENCOUNTER — OUTPATIENT (OUTPATIENT)
Dept: OUTPATIENT SERVICES | Facility: HOSPITAL | Age: 75
LOS: 1 days | Discharge: HOME | End: 2025-05-29

## 2025-05-29 DIAGNOSIS — Z98.890 OTHER SPECIFIED POSTPROCEDURAL STATES: Chronic | ICD-10-CM

## 2025-05-29 DIAGNOSIS — C50.919 MALIGNANT NEOPLASM OF UNSPECIFIED SITE OF UNSPECIFIED FEMALE BREAST: ICD-10-CM

## 2025-06-03 ENCOUNTER — RESULT REVIEW (OUTPATIENT)
Age: 75
End: 2025-06-03

## 2025-06-03 ENCOUNTER — APPOINTMENT (OUTPATIENT)
Dept: HEMATOLOGY ONCOLOGY | Facility: CLINIC | Age: 75
End: 2025-06-03
Payer: MEDICARE

## 2025-06-03 VITALS
HEIGHT: 62 IN | DIASTOLIC BLOOD PRESSURE: 51 MMHG | TEMPERATURE: 97.5 F | WEIGHT: 139.2 LBS | OXYGEN SATURATION: 97 % | BODY MASS INDEX: 25.62 KG/M2 | HEART RATE: 69 BPM | SYSTOLIC BLOOD PRESSURE: 132 MMHG

## 2025-06-03 DIAGNOSIS — C50.912 MALIGNANT NEOPLASM OF UNSPECIFIED SITE OF LEFT FEMALE BREAST: ICD-10-CM

## 2025-06-03 LAB
BASOPHILS # BLD AUTO: 0.11 K/UL — SIGNIFICANT CHANGE UP (ref 0–0.2)
BASOPHILS NFR BLD AUTO: 1.6 % — SIGNIFICANT CHANGE UP (ref 0–2)
EOSINOPHIL # BLD AUTO: 0.2 K/UL — SIGNIFICANT CHANGE UP (ref 0–0.5)
EOSINOPHIL NFR BLD AUTO: 2.8 % — SIGNIFICANT CHANGE UP (ref 0–6)
HCT VFR BLD CALC: 40.4 % — SIGNIFICANT CHANGE UP (ref 34.5–45)
HGB BLD-MCNC: 13.2 G/DL — SIGNIFICANT CHANGE UP (ref 11.5–15.5)
IMM GRANULOCYTES # BLD AUTO: 0.03 K/UL — SIGNIFICANT CHANGE UP (ref 0–0.07)
IMM GRANULOCYTES NFR BLD AUTO: 0.4 % — SIGNIFICANT CHANGE UP (ref 0–0.9)
LYMPHOCYTES # BLD AUTO: 1.9 K/UL — SIGNIFICANT CHANGE UP (ref 1–3.3)
LYMPHOCYTES NFR BLD AUTO: 26.9 % — SIGNIFICANT CHANGE UP (ref 13–44)
MCHC RBC-ENTMCNC: 32.3 PG — SIGNIFICANT CHANGE UP (ref 27–34)
MCHC RBC-ENTMCNC: 32.7 G/DL — SIGNIFICANT CHANGE UP (ref 32–36)
MCV RBC AUTO: 98.8 FL — SIGNIFICANT CHANGE UP (ref 80–100)
MONOCYTES # BLD AUTO: 0.65 K/UL — SIGNIFICANT CHANGE UP (ref 0–0.9)
MONOCYTES NFR BLD AUTO: 9.2 % — SIGNIFICANT CHANGE UP (ref 2–14)
NEUTROPHILS # BLD AUTO: 4.18 K/UL — SIGNIFICANT CHANGE UP (ref 1.8–7.4)
NEUTROPHILS NFR BLD AUTO: 59.1 % — SIGNIFICANT CHANGE UP (ref 43–77)
NRBC # BLD AUTO: 0 K/UL — SIGNIFICANT CHANGE UP (ref 0–0)
NRBC # FLD: 0 K/UL — SIGNIFICANT CHANGE UP (ref 0–0)
NRBC BLD AUTO-RTO: 0 /100 WBCS — SIGNIFICANT CHANGE UP (ref 0–0)
PLATELET # BLD AUTO: 288 K/UL — SIGNIFICANT CHANGE UP (ref 150–400)
PMV BLD: 8.9 FL — SIGNIFICANT CHANGE UP (ref 7–13)
RBC # BLD: 4.09 M/UL — SIGNIFICANT CHANGE UP (ref 3.8–5.2)
RBC # FLD: 12.6 % — SIGNIFICANT CHANGE UP (ref 10.3–14.5)
WBC # BLD: 7.07 K/UL — SIGNIFICANT CHANGE UP (ref 3.8–10.5)
WBC # FLD AUTO: 7.07 K/UL — SIGNIFICANT CHANGE UP (ref 3.8–10.5)

## 2025-06-03 PROCEDURE — 99214 OFFICE O/P EST MOD 30 MIN: CPT

## 2025-06-03 PROCEDURE — G2211 COMPLEX E/M VISIT ADD ON: CPT

## 2025-06-04 LAB
ALBUMIN SERPL ELPH-MCNC: 4.6 G/DL
ALP BLD-CCNC: 69 U/L
ALT SERPL-CCNC: 29 U/L
ANION GAP SERPL CALC-SCNC: 14 MMOL/L
AST SERPL-CCNC: 28 U/L
BILIRUB SERPL-MCNC: 0.4 MG/DL
BUN SERPL-MCNC: 13 MG/DL
CALCIUM SERPL-MCNC: 9.9 MG/DL
CHLORIDE SERPL-SCNC: 104 MMOL/L
CO2 SERPL-SCNC: 25 MMOL/L
CREAT SERPL-MCNC: 0.86 MG/DL
EGFRCR SERPLBLD CKD-EPI 2021: 71 ML/MIN/1.73M2
POTASSIUM SERPL-SCNC: 5.3 MMOL/L
PROT SERPL-MCNC: 7.3 G/DL
SODIUM SERPL-SCNC: 143 MMOL/L

## 2025-07-01 ENCOUNTER — OFFICE (OUTPATIENT)
Dept: URBAN - METROPOLITAN AREA CLINIC 38 | Facility: CLINIC | Age: 75
Setting detail: OPHTHALMOLOGY
End: 2025-07-01
Payer: MEDICARE

## 2025-07-01 DIAGNOSIS — H90.6: ICD-10-CM

## 2025-07-01 PROCEDURE — 92550 TYMPANOMETRY & REFLEX THRESH: CPT | Performed by: AUDIOLOGIST

## 2025-07-01 PROCEDURE — 92557 COMPREHENSIVE HEARING TEST: CPT | Performed by: AUDIOLOGIST

## 2025-07-23 ENCOUNTER — OFFICE (OUTPATIENT)
Dept: URBAN - METROPOLITAN AREA CLINIC 38 | Facility: CLINIC | Age: 75
Setting detail: OPHTHALMOLOGY
End: 2025-07-23

## 2025-07-23 DIAGNOSIS — H90.3: ICD-10-CM

## 2025-07-23 PROCEDURE — EMOLD EAR MOLD: Performed by: AUDIOLOGIST

## 2025-08-26 ENCOUNTER — OUTPATIENT (OUTPATIENT)
Dept: OUTPATIENT SERVICES | Facility: HOSPITAL | Age: 75
LOS: 1 days | End: 2025-08-26
Payer: MEDICARE

## 2025-08-26 ENCOUNTER — APPOINTMENT (OUTPATIENT)
Dept: MRI IMAGING | Facility: CLINIC | Age: 75
End: 2025-08-26
Payer: MEDICARE

## 2025-08-26 ENCOUNTER — RESULT REVIEW (OUTPATIENT)
Age: 75
End: 2025-08-26

## 2025-08-26 DIAGNOSIS — Z98.890 OTHER SPECIFIED POSTPROCEDURAL STATES: Chronic | ICD-10-CM

## 2025-08-26 DIAGNOSIS — Z00.8 ENCOUNTER FOR OTHER GENERAL EXAMINATION: ICD-10-CM

## 2025-08-26 PROCEDURE — C8908: CPT | Mod: MH

## 2025-08-26 PROCEDURE — 77049 MRI BREAST C-+ W/CAD BI: CPT | Mod: 26

## 2025-08-26 PROCEDURE — C8937: CPT

## 2025-08-26 PROCEDURE — A9585: CPT
